# Patient Record
Sex: FEMALE | Race: WHITE | NOT HISPANIC OR LATINO | URBAN - METROPOLITAN AREA
[De-identification: names, ages, dates, MRNs, and addresses within clinical notes are randomized per-mention and may not be internally consistent; named-entity substitution may affect disease eponyms.]

---

## 2022-02-28 ENCOUNTER — OUTPATIENT (OUTPATIENT)
Dept: OUTPATIENT SERVICES | Facility: HOSPITAL | Age: 72
LOS: 1 days | End: 2022-02-28
Payer: MEDICARE

## 2022-02-28 ENCOUNTER — TRANSCRIPTION ENCOUNTER (OUTPATIENT)
Age: 72
End: 2022-02-28

## 2022-02-28 PROCEDURE — 93010 ELECTROCARDIOGRAM REPORT: CPT

## 2022-03-04 DIAGNOSIS — Z01.810 ENCOUNTER FOR PREPROCEDURAL CARDIOVASCULAR EXAMINATION: ICD-10-CM

## 2022-03-04 DIAGNOSIS — Z01.812 ENCOUNTER FOR PREPROCEDURAL LABORATORY EXAMINATION: ICD-10-CM

## 2022-03-04 DIAGNOSIS — M16.11 UNILATERAL PRIMARY OSTEOARTHRITIS, RIGHT HIP: ICD-10-CM

## 2022-03-21 ENCOUNTER — INPATIENT (INPATIENT)
Facility: HOSPITAL | Age: 72
LOS: 1 days | Discharge: NURSING, REHAB OR LONG-TERM CARE | End: 2022-03-23
Payer: MEDICARE

## 2022-03-21 ENCOUNTER — OUTPATIENT (OUTPATIENT)
Dept: OUTPATIENT SERVICES | Facility: HOSPITAL | Age: 72
LOS: 1 days | End: 2022-03-21

## 2022-03-21 PROCEDURE — 88304 TISSUE EXAM BY PATHOLOGIST: CPT | Mod: 26

## 2022-03-21 PROCEDURE — 88311 DECALCIFY TISSUE: CPT | Mod: 26

## 2022-03-22 ENCOUNTER — OUTPATIENT (OUTPATIENT)
Dept: OUTPATIENT SERVICES | Facility: HOSPITAL | Age: 72
LOS: 1 days | End: 2022-03-22

## 2022-03-23 ENCOUNTER — OUTPATIENT (OUTPATIENT)
Dept: OUTPATIENT SERVICES | Facility: HOSPITAL | Age: 72
LOS: 1 days | End: 2022-03-23

## 2022-03-23 ENCOUNTER — INPATIENT (INPATIENT)
Facility: HOSPITAL | Age: 72
LOS: 7 days | Discharge: HOME | End: 2022-03-31
Payer: MEDICARE

## 2022-03-28 DIAGNOSIS — M16.11 UNILATERAL PRIMARY OSTEOARTHRITIS, RIGHT HIP: ICD-10-CM

## 2022-03-28 DIAGNOSIS — M06.9 RHEUMATOID ARTHRITIS, UNSPECIFIED: ICD-10-CM

## 2022-03-28 DIAGNOSIS — K21.9 GASTRO-ESOPHAGEAL REFLUX DISEASE WITHOUT ESOPHAGITIS: ICD-10-CM

## 2022-03-28 DIAGNOSIS — R73.09 OTHER ABNORMAL GLUCOSE: ICD-10-CM

## 2022-03-28 DIAGNOSIS — Z20.822 CONTACT WITH AND (SUSPECTED) EXPOSURE TO COVID-19: ICD-10-CM

## 2022-03-28 DIAGNOSIS — Z79.02 LONG TERM (CURRENT) USE OF ANTITHROMBOTICS/ANTIPLATELETS: ICD-10-CM

## 2022-03-28 DIAGNOSIS — Z87.891 PERSONAL HISTORY OF NICOTINE DEPENDENCE: ICD-10-CM

## 2022-03-28 DIAGNOSIS — E78.5 HYPERLIPIDEMIA, UNSPECIFIED: ICD-10-CM

## 2022-03-28 DIAGNOSIS — Z86.73 PERSONAL HISTORY OF TRANSIENT ISCHEMIC ATTACK (TIA), AND CEREBRAL INFARCTION WITHOUT RESIDUAL DEFICITS: ICD-10-CM

## 2022-03-28 DIAGNOSIS — I10 ESSENTIAL (PRIMARY) HYPERTENSION: ICD-10-CM

## 2022-03-30 PROCEDURE — 93970 EXTREMITY STUDY: CPT | Mod: 26

## 2022-04-01 DIAGNOSIS — Z51.89 ENCOUNTER FOR OTHER SPECIFIED AFTERCARE: ICD-10-CM

## 2022-04-01 DIAGNOSIS — M06.9 RHEUMATOID ARTHRITIS, UNSPECIFIED: ICD-10-CM

## 2022-04-01 DIAGNOSIS — Z96.641 PRESENCE OF RIGHT ARTIFICIAL HIP JOINT: ICD-10-CM

## 2022-04-01 DIAGNOSIS — G60.9 HEREDITARY AND IDIOPATHIC NEUROPATHY, UNSPECIFIED: ICD-10-CM

## 2022-04-01 DIAGNOSIS — I10 ESSENTIAL (PRIMARY) HYPERTENSION: ICD-10-CM

## 2022-04-01 DIAGNOSIS — Z47.1 AFTERCARE FOLLOWING JOINT REPLACEMENT SURGERY: ICD-10-CM

## 2022-04-01 DIAGNOSIS — R26.89 OTHER ABNORMALITIES OF GAIT AND MOBILITY: ICD-10-CM

## 2022-04-01 DIAGNOSIS — M62.50 MUSCLE WASTING AND ATROPHY, NOT ELSEWHERE CLASSIFIED, UNSPECIFIED SITE: ICD-10-CM

## 2022-04-01 DIAGNOSIS — Z86.73 PERSONAL HISTORY OF TRANSIENT ISCHEMIC ATTACK (TIA), AND CEREBRAL INFARCTION WITHOUT RESIDUAL DEFICITS: ICD-10-CM

## 2022-04-06 DIAGNOSIS — M16.11 UNILATERAL PRIMARY OSTEOARTHRITIS, RIGHT HIP: ICD-10-CM

## 2022-04-09 DIAGNOSIS — Z96.641 PRESENCE OF RIGHT ARTIFICIAL HIP JOINT: ICD-10-CM

## 2022-04-09 DIAGNOSIS — E11.9 TYPE 2 DIABETES MELLITUS WITHOUT COMPLICATIONS: ICD-10-CM

## 2022-04-13 DIAGNOSIS — M16.11 UNILATERAL PRIMARY OSTEOARTHRITIS, RIGHT HIP: ICD-10-CM

## 2022-05-14 ENCOUNTER — NON-APPOINTMENT (OUTPATIENT)
Age: 72
End: 2022-05-14

## 2022-09-05 ENCOUNTER — NON-APPOINTMENT (OUTPATIENT)
Age: 72
End: 2022-09-05

## 2022-12-06 ENCOUNTER — NON-APPOINTMENT (OUTPATIENT)
Age: 72
End: 2022-12-06

## 2023-03-20 ENCOUNTER — EMERGENCY (EMERGENCY)
Facility: HOSPITAL | Age: 73
LOS: 1 days | Discharge: HOME | End: 2023-03-20
Attending: EMERGENCY MEDICINE
Payer: MEDICARE

## 2023-03-20 VITALS
RESPIRATION RATE: 18 BRPM | OXYGEN SATURATION: 96 % | TEMPERATURE: 99 F | WEIGHT: 218.04 LBS | HEART RATE: 72 BPM | SYSTOLIC BLOOD PRESSURE: 106 MMHG | DIASTOLIC BLOOD PRESSURE: 66 MMHG

## 2023-03-20 LAB
ALBUMIN SERPL ELPH-MCNC: 3.7 G/DL — SIGNIFICANT CHANGE UP (ref 3.3–5.2)
ALP SERPL-CCNC: 65 U/L — SIGNIFICANT CHANGE UP (ref 40–120)
ALT FLD-CCNC: 32 U/L — SIGNIFICANT CHANGE UP
ANION GAP SERPL CALC-SCNC: 12 MMOL/L — SIGNIFICANT CHANGE UP (ref 5–17)
APPEARANCE UR: CLEAR — SIGNIFICANT CHANGE UP
APTT BLD: 26.4 SEC — LOW (ref 27.5–35.5)
AST SERPL-CCNC: 39 U/L — HIGH
BASE EXCESS BLDV CALC-SCNC: 0.2 MMOL/L — SIGNIFICANT CHANGE UP (ref -2–3)
BASOPHILS # BLD AUTO: 0.02 K/UL — SIGNIFICANT CHANGE UP (ref 0–0.2)
BASOPHILS NFR BLD AUTO: 0.3 % — SIGNIFICANT CHANGE UP (ref 0–2)
BILIRUB SERPL-MCNC: 0.3 MG/DL — LOW (ref 0.4–2)
BILIRUB UR-MCNC: NEGATIVE — SIGNIFICANT CHANGE UP
BUN SERPL-MCNC: 35.7 MG/DL — HIGH (ref 8–20)
CA-I SERPL-SCNC: 1.36 MMOL/L — HIGH (ref 1.15–1.33)
CALCIUM SERPL-MCNC: 10.2 MG/DL — SIGNIFICANT CHANGE UP (ref 8.4–10.5)
CHLORIDE BLDV-SCNC: 103 MMOL/L — SIGNIFICANT CHANGE UP (ref 96–108)
CHLORIDE SERPL-SCNC: 102 MMOL/L — SIGNIFICANT CHANGE UP (ref 96–108)
CO2 SERPL-SCNC: 26 MMOL/L — SIGNIFICANT CHANGE UP (ref 22–29)
COLOR SPEC: YELLOW — SIGNIFICANT CHANGE UP
CREAT SERPL-MCNC: 1.4 MG/DL — HIGH (ref 0.5–1.3)
DIFF PNL FLD: NEGATIVE — SIGNIFICANT CHANGE UP
EGFR: 39 ML/MIN/1.73M2 — LOW
EOSINOPHIL # BLD AUTO: 0.12 K/UL — SIGNIFICANT CHANGE UP (ref 0–0.5)
EOSINOPHIL NFR BLD AUTO: 1.9 % — SIGNIFICANT CHANGE UP (ref 0–6)
EPI CELLS # UR: SIGNIFICANT CHANGE UP
ETHANOL SERPL-MCNC: <10 MG/DL — SIGNIFICANT CHANGE UP (ref 0–9)
FLUAV AG NPH QL: SIGNIFICANT CHANGE UP
FLUBV AG NPH QL: SIGNIFICANT CHANGE UP
GAS PNL BLDV: 138 MMOL/L — SIGNIFICANT CHANGE UP (ref 136–145)
GAS PNL BLDV: SIGNIFICANT CHANGE UP
GLUCOSE BLDV-MCNC: 161 MG/DL — HIGH (ref 70–99)
GLUCOSE SERPL-MCNC: 171 MG/DL — HIGH (ref 70–99)
GLUCOSE UR QL: NEGATIVE MG/DL — SIGNIFICANT CHANGE UP
HCO3 BLDV-SCNC: 27 MMOL/L — SIGNIFICANT CHANGE UP (ref 22–29)
HCT VFR BLD CALC: 33 % — LOW (ref 34.5–45)
HCT VFR BLDA CALC: 34 % — SIGNIFICANT CHANGE UP
HGB BLD CALC-MCNC: 11.2 G/DL — LOW (ref 11.7–16.1)
HGB BLD-MCNC: 10.8 G/DL — LOW (ref 11.5–15.5)
HIV 1 & 2 AB SERPL IA.RAPID: SIGNIFICANT CHANGE UP
IMM GRANULOCYTES NFR BLD AUTO: 0.2 % — SIGNIFICANT CHANGE UP (ref 0–0.9)
INR BLD: 1.01 RATIO — SIGNIFICANT CHANGE UP (ref 0.88–1.16)
KETONES UR-MCNC: NEGATIVE — SIGNIFICANT CHANGE UP
LACTATE BLDV-MCNC: 1.7 MMOL/L — SIGNIFICANT CHANGE UP (ref 0.5–2)
LEUKOCYTE ESTERASE UR-ACNC: ABNORMAL
LYMPHOCYTES # BLD AUTO: 2.97 K/UL — SIGNIFICANT CHANGE UP (ref 1–3.3)
LYMPHOCYTES # BLD AUTO: 46.2 % — HIGH (ref 13–44)
MCHC RBC-ENTMCNC: 30.2 PG — SIGNIFICANT CHANGE UP (ref 27–34)
MCHC RBC-ENTMCNC: 32.7 GM/DL — SIGNIFICANT CHANGE UP (ref 32–36)
MCV RBC AUTO: 92.2 FL — SIGNIFICANT CHANGE UP (ref 80–100)
MONOCYTES # BLD AUTO: 0.65 K/UL — SIGNIFICANT CHANGE UP (ref 0–0.9)
MONOCYTES NFR BLD AUTO: 10.1 % — SIGNIFICANT CHANGE UP (ref 2–14)
NEUTROPHILS # BLD AUTO: 2.66 K/UL — SIGNIFICANT CHANGE UP (ref 1.8–7.4)
NEUTROPHILS NFR BLD AUTO: 41.3 % — LOW (ref 43–77)
NITRITE UR-MCNC: NEGATIVE — SIGNIFICANT CHANGE UP
PCO2 BLDV: 54 MMHG — HIGH (ref 39–42)
PH BLDV: 7.3 — LOW (ref 7.32–7.43)
PH UR: 6 — SIGNIFICANT CHANGE UP (ref 5–8)
PLATELET # BLD AUTO: 160 K/UL — SIGNIFICANT CHANGE UP (ref 150–400)
PO2 BLDV: 99 MMHG — HIGH (ref 25–45)
POTASSIUM BLDV-SCNC: 3.9 MMOL/L — SIGNIFICANT CHANGE UP (ref 3.5–5.1)
POTASSIUM SERPL-MCNC: 4.2 MMOL/L — SIGNIFICANT CHANGE UP (ref 3.5–5.3)
POTASSIUM SERPL-SCNC: 4.2 MMOL/L — SIGNIFICANT CHANGE UP (ref 3.5–5.3)
PROT SERPL-MCNC: 6.3 G/DL — LOW (ref 6.6–8.7)
PROT UR-MCNC: NEGATIVE — SIGNIFICANT CHANGE UP
PROTHROM AB SERPL-ACNC: 11.7 SEC — SIGNIFICANT CHANGE UP (ref 10.5–13.4)
RBC # BLD: 3.58 M/UL — LOW (ref 3.8–5.2)
RBC # FLD: 13.5 % — SIGNIFICANT CHANGE UP (ref 10.3–14.5)
RBC CASTS # UR COMP ASSIST: NEGATIVE /HPF — SIGNIFICANT CHANGE UP (ref 0–4)
RSV RNA NPH QL NAA+NON-PROBE: SIGNIFICANT CHANGE UP
SAO2 % BLDV: 99.1 % — SIGNIFICANT CHANGE UP
SARS-COV-2 RNA SPEC QL NAA+PROBE: SIGNIFICANT CHANGE UP
SODIUM SERPL-SCNC: 140 MMOL/L — SIGNIFICANT CHANGE UP (ref 135–145)
SP GR SPEC: 1.01 — SIGNIFICANT CHANGE UP (ref 1.01–1.02)
TROPONIN T SERPL-MCNC: <0.01 NG/ML — SIGNIFICANT CHANGE UP (ref 0–0.06)
UROBILINOGEN FLD QL: NEGATIVE MG/DL — SIGNIFICANT CHANGE UP
WBC # BLD: 6.43 K/UL — SIGNIFICANT CHANGE UP (ref 3.8–10.5)
WBC # FLD AUTO: 6.43 K/UL — SIGNIFICANT CHANGE UP (ref 3.8–10.5)
WBC UR QL: SIGNIFICANT CHANGE UP /HPF (ref 0–5)

## 2023-03-20 PROCEDURE — 70498 CT ANGIOGRAPHY NECK: CPT | Mod: 26,MA

## 2023-03-20 PROCEDURE — 70496 CT ANGIOGRAPHY HEAD: CPT | Mod: 26,MA

## 2023-03-20 PROCEDURE — 0042T: CPT

## 2023-03-20 PROCEDURE — 99223 1ST HOSP IP/OBS HIGH 75: CPT | Mod: FS

## 2023-03-20 RX ORDER — OXYCODONE HYDROCHLORIDE 5 MG/1
10 TABLET ORAL ONCE
Refills: 0 | Status: DISCONTINUED | OUTPATIENT
Start: 2023-03-20 | End: 2023-03-20

## 2023-03-20 RX ADMIN — OXYCODONE HYDROCHLORIDE 10 MILLIGRAM(S): 5 TABLET ORAL at 23:17

## 2023-03-20 NOTE — ED CDU PROVIDER INITIAL DAY NOTE - OBJECTIVE STATEMENT
73 year old female with hx of HTN, 3 TIAs, on plavix, and Rheumatoid arthritis who presents with weakness. At 23:00 patient went to bed feeling normal. Patient states that she woke up at 10:00 with weakness, ataxia, and feeling "Confused." Feels that she is more weak on left side. She thought these symptoms may resolve spontaneously however they progressively worsened, prompting ED visit. BG on arrival 160. Brought to CT immediately for code stroke.

## 2023-03-20 NOTE — ED CDU PROVIDER INITIAL DAY NOTE - CLINICAL SUMMARY MEDICAL DECISION MAKING FREE TEXT BOX
PT with   resolved neurological symptoms       placed in obs for Diagnostic uncertainty. PT seen BY OBS PA found to be appropriate CDU PT care transferred to PA.

## 2023-03-20 NOTE — ED PROVIDER NOTE - CLINICAL SUMMARY MEDICAL DECISION MAKING FREE TEXT BOX
73 year old female with hx of HTN, 3 TIAs, on plavix, and Rheumatoid arthritis who presents with weakness/ataxia that resolved after CT imaging. Labs with elevated creatinine 1.4, hgb 10.8 (no baseline available). Pt stated that prior hx of similar events during UTIs however UA not c/w UTI. CT imaging with left ICA stenosis, otherwise unremarkable. ED consult to neuro will be placed for AM in addition to MRI brain, obs stay for further evaluation

## 2023-03-20 NOTE — ED PROVIDER NOTE - PHYSICAL EXAMINATION
Gen: Well appearing in NAD  Head: NC/AT  Neck: trachea midline  Resp:  No distress  Ext: no deformities  Neuro: see NIHSS   Skin:  Warm and dry as visualized  Psych: Calm, cooperative

## 2023-03-20 NOTE — ED ADULT TRIAGE NOTE - CODE STROKE ACTIVATED DT
May 18, 2018      Stepan Pierre MD  [address] [place] [address]           [facility]phrology  [address] [facility] [address] [place] [address]  Phone: [phone]  Fax: [phone]          Patient: David Amaro   MR Number: [id number]   YOB: 1947   Date of Visit: 5/18/2018       Dear Dr. Stepan Pierre:    Thank you for referring David Amaro to me for evaluation. Attached you will find relevant portions of my assessment and plan of care.    If you have questions, please do not hesitate to call me. I look forward to following David Amaro along with you.    Sincerely,    Kurtis Garrett MD    Enclosure  CC:  No Recipients    If you would like to receive this communication electronically, please contact [email or web address] or [phone] to request more information on [employer] access.    For providers and/or their staff who would like to refer a patient to [facility], please contact us through our one-stop-shop provider referral line, [facility] [place], operated by [facility], at [phone].    If you feel you have received this communication in error or would no longer like to receive these types of communications, please e-mail [email or web address]         
20-Mar-2023 20:00

## 2023-03-20 NOTE — ED ADULT TRIAGE NOTE - CHIEF COMPLAINT QUOTE
c/o of generalized weakness and neck pain since this 10am. report fell this morning.  Hx of TIA x 3 on plavix

## 2023-03-20 NOTE — ED PROVIDER NOTE - OBJECTIVE STATEMENT
73 year old female with hx of HTN, 3 TIAs, on plavix who presents with weakness. Patient states that she woke up at 10:00 with weakness, ataxia, 73 year old female with hx of HTN, 3 TIAs, on plavix, and Rheumatoid arthritis who presents with weakness. At 23:00 patient went to bed feeling normal. Patient states that she woke up at 10:00 with weakness, ataxia, and feeling "Confused." Feels that she is more weak on left side. She thought these symptoms may resolve spontaneously however they progressively worsened, prompting ED visit. BG on arrival 160. Brought to CT immediately for code stroke.

## 2023-03-20 NOTE — ED ADULT NURSE NOTE - NSIMPLEMENTINTERV_GEN_ALL_ED
Implemented All Fall with Harm Risk Interventions:  [place] to call system. Call bell, personal items and telephone within reach. Instruct patient to call for assistance. Room bathroom lighting operational. Non-slip footwear when patient is off stretcher. Physically safe environment: no spills, clutter or unnecessary equipment. Stretcher in lowest position, wheels locked, appropriate side rails in place. Provide visual cue, wrist band, yellow gown, etc. Monitor gait and stability. Monitor for mental status changes and reorient to person, place, and time. Review medications for side effects contributing to fall risk. Reinforce activity limits and safety measures with patient and family. Provide visual clues: red socks.

## 2023-03-20 NOTE — ED PROVIDER NOTE - PROGRESS NOTE DETAILS
Derrick: IV access attempted during initial interview however will do CT non con and then re attempt IV access Derrick: Received call from Dr. Carter, CT head non con negative. Derrick: IV access attempted during initial interview however will do CT non con and then re attempt IV access. No ataxia noted during short ambulation to CT table.

## 2023-03-20 NOTE — ED ADULT NURSE NOTE - OBJECTIVE STATEMENT
pt A&Ox4, c/o right head and neck pain, generalized weakness but reports feeling worsening weakness to left side and that she keeps dropping things with her left hand that she noticed when she woke up today at 10am LKW yesterday at 11pm when she went to bed, resp even and unlabored no distress noted, abd soft NTND, pt has hx of TIAs and is on Plavix

## 2023-03-20 NOTE — ED CDU PROVIDER INITIAL DAY NOTE - NS ED ATTENDING STATEMENT MOD
This was a shared visit with the ADELAIDE. I reviewed and verified the documentation and independently performed the documented:

## 2023-03-21 VITALS
DIASTOLIC BLOOD PRESSURE: 70 MMHG | SYSTOLIC BLOOD PRESSURE: 113 MMHG | OXYGEN SATURATION: 97 % | TEMPERATURE: 98 F | HEART RATE: 66 BPM | RESPIRATION RATE: 18 BRPM

## 2023-03-21 PROCEDURE — 70551 MRI BRAIN STEM W/O DYE: CPT | Mod: 26,MG

## 2023-03-21 PROCEDURE — G1004: CPT

## 2023-03-21 PROCEDURE — 99238 HOSP IP/OBS DSCHRG MGMT 30/<: CPT

## 2023-03-21 RX ORDER — METOPROLOL TARTRATE 50 MG
100 TABLET ORAL DAILY
Refills: 0 | Status: DISCONTINUED | OUTPATIENT
Start: 2023-03-21 | End: 2023-03-22

## 2023-03-21 RX ORDER — ATORVASTATIN CALCIUM 80 MG/1
20 TABLET, FILM COATED ORAL AT BEDTIME
Refills: 0 | Status: DISCONTINUED | OUTPATIENT
Start: 2023-03-21 | End: 2023-03-22

## 2023-03-21 RX ORDER — OXYCODONE HYDROCHLORIDE 5 MG/1
10 TABLET ORAL
Refills: 0 | Status: DISCONTINUED | OUTPATIENT
Start: 2023-03-21 | End: 2023-03-22

## 2023-03-21 RX ORDER — CLOPIDOGREL BISULFATE 75 MG/1
75 TABLET, FILM COATED ORAL DAILY
Refills: 0 | Status: DISCONTINUED | OUTPATIENT
Start: 2023-03-21 | End: 2023-03-22

## 2023-03-21 RX ORDER — GABAPENTIN 400 MG/1
200 CAPSULE ORAL ONCE
Refills: 0 | Status: COMPLETED | OUTPATIENT
Start: 2023-03-21 | End: 2023-03-21

## 2023-03-21 RX ORDER — AMLODIPINE BESYLATE 2.5 MG/1
10 TABLET ORAL AT BEDTIME
Refills: 0 | Status: DISCONTINUED | OUTPATIENT
Start: 2023-03-21 | End: 2023-03-22

## 2023-03-21 RX ORDER — OXYCODONE HYDROCHLORIDE 5 MG/1
10 TABLET ORAL ONCE
Refills: 0 | Status: DISCONTINUED | OUTPATIENT
Start: 2023-03-21 | End: 2023-03-22

## 2023-03-21 RX ORDER — VALSARTAN 80 MG/1
320 TABLET ORAL DAILY
Refills: 0 | Status: DISCONTINUED | OUTPATIENT
Start: 2023-03-21 | End: 2023-03-22

## 2023-03-21 RX ADMIN — OXYCODONE HYDROCHLORIDE 10 MILLIGRAM(S): 5 TABLET ORAL at 05:44

## 2023-03-21 RX ADMIN — GABAPENTIN 200 MILLIGRAM(S): 400 CAPSULE ORAL at 05:45

## 2023-03-21 RX ADMIN — VALSARTAN 320 MILLIGRAM(S): 80 TABLET ORAL at 05:46

## 2023-03-21 NOTE — ED CDU PROVIDER DISPOSITION NOTE - ATTENDING CONTRIBUTION TO CARE
placed on observation for MRI imaging after presenting to the ED with left hand weakness, unsteadiness getting OOB and feeling confused.  MRI demonstrates no abnormal restricted diffusion to suggest acute infarction. No abnormal signal is demonstrated throughout the brain parenchyma.  Pt to follow-up with neurology for further investigation.

## 2023-03-21 NOTE — ED CDU PROVIDER DISPOSITION NOTE - CARE PROVIDER_API CALL
Wil Henson)  Neurology  [address] [place] [address], [facility] [address]  [place] [address]  Phone: [phone]  Fax: [phone]  Follow Up Time:

## 2023-03-21 NOTE — ED CDU PROVIDER DISPOSITION NOTE - CLINICAL COURSE
73 year old female with hx of HTN, 3 TIAs, on plavix, and Rheumatoid arthritis who presents with weakness. At 23:00 patient went to bed feeling normal. Patient states that she woke up at 10:00 with weakness, ataxia, and feeling "Confused." Feels that she is more weak on left side. She thought these symptoms may resolve spontaneously however they progressively worsened, prompting ED visit. BG on arrival 160. Brought to CT immediately for code stroke.  CT/CTA head neck unremarkable.  Placed into observation for MRI.  MRI unremarkable.  Stable for outpatient f/u with neurology, given copies of all results.

## 2023-03-21 NOTE — ED ADULT NURSE REASSESSMENT NOTE - NS ED NURSE REASSESS COMMENT FT1
Received pt from [facility]. Pt is alert and O x4. NAD noted. Resp E/U. Pt denies any pain at this time. Pt made aware of the plan of care. Will continue to monitor.
Assumed care of pt at this time. Pt A&O x 3 (disoriented to place) presents to ED c/o of weakness. Pt alert, awake, and following directions. Strength WNL. Sensory intact. PERRL. Denies numbness/tingling. No N/V. Denies lightheadedness or dizziness. Bed locked and in lowest position. Call bell within reach. [place] footwear.

## 2023-03-21 NOTE — ED CDU PROVIDER SUBSEQUENT DAY NOTE - ATTENDING APP SHARED VISIT CONTRIBUTION OF CARE
Seen on morning rounds:   Reports "feeling okay".  Describes difficulty getting out of bed yesterday morning: reports no strength.  Felt unsteady upon getting up and fell.  Reports pain to the right side of the neck and weakness of left hand after episode.  Felt confused.  Denies slurred speech but reports that daughter said that patient was not "making sense".  Pt reports that she had difficulty expressing herself.  Now ok.  Placed on observation for MR imaging:  no abnormal restricted diffusion to suggest acute infarction. No abnormal signal is demonstrated throughout the brain parenchyma.

## 2023-03-21 NOTE — ED ADULT NURSE REASSESSMENT NOTE - NURSING NEURO ORIENTATION
disoriented to place
oriented to person, place and time

## 2023-03-21 NOTE — ED CDU PROVIDER DISPOSITION NOTE - PATIENT PORTAL LINK FT
You can access the FollowMyHealth Patient Portal offered by [facility] by registering at the following website: [email or web address]. By joining [employer]’s FollowMyHealth portal, you will also be able to view your health information using other applications (apps) compatible with our system.

## 2023-03-21 NOTE — ED CDU PROVIDER SUBSEQUENT DAY NOTE - HISTORY
PT placed in OBS, has had no acute incidents or complaints while in CDU PT is stable. PT Placed in OBS for  neurological symptoms that have since resolved, Pt placed in obs for MR.

## 2023-03-21 NOTE — ED ADULT NURSE REASSESSMENT NOTE - GENERAL PATIENT STATE
comfortable appearance/cooperative/resting/sleeping
comfortable appearance/cooperative
comfortable appearance/resting/sleeping

## 2023-03-22 ENCOUNTER — TRANSCRIPTION ENCOUNTER (OUTPATIENT)
Age: 73
End: 2023-03-22

## 2023-03-22 ENCOUNTER — INPATIENT (INPATIENT)
Facility: HOSPITAL | Age: 73
LOS: 2 days | Discharge: HOME | DRG: 62 | End: 2023-03-25
Attending: STUDENT IN AN ORGANIZED HEALTH CARE EDUCATION/TRAINING PROGRAM | Admitting: GENERAL ACUTE CARE HOSPITAL
Payer: MEDICARE

## 2023-03-22 VITALS
OXYGEN SATURATION: 99 % | SYSTOLIC BLOOD PRESSURE: 136 MMHG | TEMPERATURE: 99 F | HEART RATE: 74 BPM | HEIGHT: 67 IN | RESPIRATION RATE: 20 BRPM | DIASTOLIC BLOOD PRESSURE: 90 MMHG

## 2023-03-22 DIAGNOSIS — I63.9 CEREBRAL INFARCTION, UNSPECIFIED: ICD-10-CM

## 2023-03-22 LAB
ALBUMIN SERPL ELPH-MCNC: 3.9 G/DL — SIGNIFICANT CHANGE UP (ref 3.3–5.2)
ALP SERPL-CCNC: 64 U/L — SIGNIFICANT CHANGE UP (ref 40–120)
ALT FLD-CCNC: 35 U/L — HIGH
ANION GAP SERPL CALC-SCNC: 9 MMOL/L — SIGNIFICANT CHANGE UP (ref 5–17)
APTT BLD: 26.5 SEC — LOW (ref 27.5–35.5)
AST SERPL-CCNC: 43 U/L — HIGH
BASOPHILS # BLD AUTO: 0.02 K/UL — SIGNIFICANT CHANGE UP (ref 0–0.2)
BASOPHILS NFR BLD AUTO: 0.4 % — SIGNIFICANT CHANGE UP (ref 0–2)
BILIRUB SERPL-MCNC: 0.4 MG/DL — SIGNIFICANT CHANGE UP (ref 0.4–2)
BUN SERPL-MCNC: 19.2 MG/DL — SIGNIFICANT CHANGE UP (ref 8–20)
CALCIUM SERPL-MCNC: 10.3 MG/DL — SIGNIFICANT CHANGE UP (ref 8.4–10.5)
CHLORIDE SERPL-SCNC: 104 MMOL/L — SIGNIFICANT CHANGE UP (ref 96–108)
CK SERPL-CCNC: 130 U/L — SIGNIFICANT CHANGE UP (ref 25–170)
CO2 SERPL-SCNC: 28 MMOL/L — SIGNIFICANT CHANGE UP (ref 22–29)
CREAT SERPL-MCNC: 1.01 MG/DL — SIGNIFICANT CHANGE UP (ref 0.5–1.3)
EGFR: 58 ML/MIN/1.73M2 — LOW
EOSINOPHIL # BLD AUTO: 0.17 K/UL — SIGNIFICANT CHANGE UP (ref 0–0.5)
EOSINOPHIL NFR BLD AUTO: 3.5 % — SIGNIFICANT CHANGE UP (ref 0–6)
GLUCOSE SERPL-MCNC: 123 MG/DL — HIGH (ref 70–99)
HCT VFR BLD CALC: 33.5 % — LOW (ref 34.5–45)
HGB BLD-MCNC: 11.2 G/DL — LOW (ref 11.5–15.5)
IMM GRANULOCYTES NFR BLD AUTO: 0.2 % — SIGNIFICANT CHANGE UP (ref 0–0.9)
INR BLD: 1.04 RATIO — SIGNIFICANT CHANGE UP (ref 0.88–1.16)
LYMPHOCYTES # BLD AUTO: 1.88 K/UL — SIGNIFICANT CHANGE UP (ref 1–3.3)
LYMPHOCYTES # BLD AUTO: 38.6 % — SIGNIFICANT CHANGE UP (ref 13–44)
MCHC RBC-ENTMCNC: 30.1 PG — SIGNIFICANT CHANGE UP (ref 27–34)
MCHC RBC-ENTMCNC: 33.4 GM/DL — SIGNIFICANT CHANGE UP (ref 32–36)
MCV RBC AUTO: 90.1 FL — SIGNIFICANT CHANGE UP (ref 80–100)
MONOCYTES # BLD AUTO: 0.44 K/UL — SIGNIFICANT CHANGE UP (ref 0–0.9)
MONOCYTES NFR BLD AUTO: 9 % — SIGNIFICANT CHANGE UP (ref 2–14)
NEUTROPHILS # BLD AUTO: 2.35 K/UL — SIGNIFICANT CHANGE UP (ref 1.8–7.4)
NEUTROPHILS NFR BLD AUTO: 48.3 % — SIGNIFICANT CHANGE UP (ref 43–77)
PLATELET # BLD AUTO: 164 K/UL — SIGNIFICANT CHANGE UP (ref 150–400)
POTASSIUM SERPL-MCNC: 4.2 MMOL/L — SIGNIFICANT CHANGE UP (ref 3.5–5.3)
POTASSIUM SERPL-SCNC: 4.2 MMOL/L — SIGNIFICANT CHANGE UP (ref 3.5–5.3)
PROT SERPL-MCNC: 6.6 G/DL — SIGNIFICANT CHANGE UP (ref 6.6–8.7)
PROTHROM AB SERPL-ACNC: 12.1 SEC — SIGNIFICANT CHANGE UP (ref 10.5–13.4)
RAPID RVP RESULT: SIGNIFICANT CHANGE UP
RBC # BLD: 3.72 M/UL — LOW (ref 3.8–5.2)
RBC # FLD: 13.4 % — SIGNIFICANT CHANGE UP (ref 10.3–14.5)
SARS-COV-2 RNA SPEC QL NAA+PROBE: SIGNIFICANT CHANGE UP
SODIUM SERPL-SCNC: 141 MMOL/L — SIGNIFICANT CHANGE UP (ref 135–145)
TROPONIN T SERPL-MCNC: <0.01 NG/ML — SIGNIFICANT CHANGE UP (ref 0–0.06)
WBC # BLD: 4.87 K/UL — SIGNIFICANT CHANGE UP (ref 3.8–10.5)
WBC # FLD AUTO: 4.87 K/UL — SIGNIFICANT CHANGE UP (ref 3.8–10.5)

## 2023-03-22 PROCEDURE — 99222 1ST HOSP IP/OBS MODERATE 55: CPT

## 2023-03-22 PROCEDURE — 93010 ELECTROCARDIOGRAM REPORT: CPT

## 2023-03-22 PROCEDURE — 99291 CRITICAL CARE FIRST HOUR: CPT

## 2023-03-22 PROCEDURE — 71045 X-RAY EXAM CHEST 1 VIEW: CPT | Mod: 26

## 2023-03-22 PROCEDURE — 70450 CT HEAD/BRAIN W/O DYE: CPT | Mod: 26,MA,77

## 2023-03-22 PROCEDURE — 70496 CT ANGIOGRAPHY HEAD: CPT | Mod: 26,MA

## 2023-03-22 PROCEDURE — 0042T: CPT

## 2023-03-22 PROCEDURE — 70498 CT ANGIOGRAPHY NECK: CPT | Mod: 26,MA

## 2023-03-22 RX ORDER — OXYCODONE HYDROCHLORIDE 5 MG/1
10 TABLET ORAL ONCE
Refills: 0 | Status: DISCONTINUED | OUTPATIENT
Start: 2023-03-22 | End: 2023-03-22

## 2023-03-22 RX ORDER — SODIUM CHLORIDE 9 MG/ML
10 INJECTION INTRAMUSCULAR; INTRAVENOUS; SUBCUTANEOUS ONCE
Refills: 0 | Status: COMPLETED | OUTPATIENT
Start: 2023-03-22 | End: 2023-03-22

## 2023-03-22 RX ORDER — ACETAMINOPHEN 500 MG
1000 TABLET ORAL ONCE
Refills: 0 | Status: COMPLETED | OUTPATIENT
Start: 2023-03-22 | End: 2023-03-23

## 2023-03-22 RX ORDER — SODIUM CHLORIDE 9 MG/ML
1000 INJECTION INTRAMUSCULAR; INTRAVENOUS; SUBCUTANEOUS
Refills: 0 | Status: DISCONTINUED | OUTPATIENT
Start: 2023-03-22 | End: 2023-03-28

## 2023-03-22 RX ORDER — ACETAMINOPHEN 500 MG
1000 TABLET ORAL ONCE
Refills: 0 | Status: DISCONTINUED | OUTPATIENT
Start: 2023-03-22 | End: 2023-03-28

## 2023-03-22 RX ORDER — SODIUM CHLORIDE 9 MG/ML
1000 INJECTION INTRAMUSCULAR; INTRAVENOUS; SUBCUTANEOUS
Refills: 0 | Status: DISCONTINUED | OUTPATIENT
Start: 2023-03-22 | End: 2023-03-23

## 2023-03-22 RX ORDER — TENECTEPLASE 50 MG
24 KIT INTRAVENOUS ONCE
Refills: 0 | Status: COMPLETED | OUTPATIENT
Start: 2023-03-22 | End: 2023-03-22

## 2023-03-22 RX ADMIN — SODIUM CHLORIDE 10 MILLILITER(S): 9 INJECTION INTRAMUSCULAR; INTRAVENOUS; SUBCUTANEOUS at 23:58

## 2023-03-22 RX ADMIN — SODIUM CHLORIDE 10 MILLILITER(S): 9 INJECTION INTRAMUSCULAR; INTRAVENOUS; SUBCUTANEOUS at 19:05

## 2023-03-22 RX ADMIN — SODIUM CHLORIDE 10 MILLILITER(S): 9 INJECTION INTRAMUSCULAR; INTRAVENOUS; SUBCUTANEOUS at 23:59

## 2023-03-22 RX ADMIN — TENECTEPLASE 3456 MILLIGRAM(S): KIT at 19:04

## 2023-03-22 RX ADMIN — OXYCODONE HYDROCHLORIDE 10 MILLIGRAM(S): 5 TABLET ORAL at 21:51

## 2023-03-22 NOTE — ED ADULT NURSE NOTE - CHIEF COMPLAINT QUOTE
pt a+ox3, BIBA from home c/o sudden onset of left hand weakness x1 hour ago. pt s/p TIA 2 days ago, reports worsening headache to lower back of head x 1 hour. MD Robertson called to bedside, code stroke activated.

## 2023-03-22 NOTE — H&P ADULT - NSHPREVIEWOFSYSTEMS_GEN_ALL_CORE
Patient denies headache, nausea,LOC,  vomitting, fever, chills, abdominal pain, urinary retention, skin changes.

## 2023-03-22 NOTE — H&P ADULT - HISTORY OF PRESENT ILLNESS
73 year old female with PMH TIA x 4 (most recent 3/21/23), HTN, RA presents with L sided weakness. Pt reports that 1 hour PTA she developed LUE weakness. She states that initially she had difficulty with getting her words out but that has improved back to baseline. No blurry vision, numbness, chets pain, fevers.  CT Head Negative. CTP Negative. NIH 2 on admission, TNK given @ 1904.

## 2023-03-22 NOTE — ED ADULT NURSE REASSESSMENT NOTE - NS ED NURSE REASSESS COMMENT FT1
patient complaining of numbness to distal 3/4/5th fingers from PIP to DIP of 3rd finger left hand and MIP to DIP are of 4/5 with shoulder pain, nih 3, neuro ica sebastien alexander aware will bring back to CT.

## 2023-03-22 NOTE — PHARMACOTHERAPY INTERVENTION NOTE - COMMENTS
Two-clinician verification performed with Dr. Robertson. Patient identification confirmed.  Eight rights of medication administration confirmed.   - Weight confirmed prior to tenecteplase dose calculation: (95.4 kG) = (24 mG) mG tenecteplase  - BP prior to teneceplase administration: (125/89)

## 2023-03-22 NOTE — H&P ADULT - NSHPPHYSICALEXAM_GEN_ALL_CORE
Physical Exam:   Constitutional: NAD, lying in bed   	Neuro  	* Mental Status:  GCS 15: E4, V5, M6. Awake, alert, oriented to conversation. + mild expressive aphasia & word finding difficulty. Able to name objects & their function.   	* Cranial Nerves: Cnii-Cnxii grossly intact. PERRL, EOMI, tongue midline, no gaze deviation.  	* Motor: RUE 5/5, LUE 4+/5, RLE 5/5, LLE 4+/5, slight drift noted in LUE/LLE with dysmetria.  R side intact.   	* Sensory: Sensation intact to light touch  	* Reflexes: Not assessed  	* Gait: Not assessed  Cardiovascular:  S1, S2   Regular rate and rhythm.  Eyes: See neurologic examination with detailed examination of eyes.  ENT: No JVD, Trachea Midline.  Respiratory: Clear to auscultation.  Gastrointestinal: Soft, nontender, nondistended.   Musculoskeletal: No muscle wasting noted,

## 2023-03-22 NOTE — H&P ADULT - ASSESSMENT
Plan:  Neuro:  - Continue q1 hour neuro checks  - CTH 24 hours post tenecteplase at 18:48 tomorrow  -MRI Brain w/o for stroke work up    CV:  -S/p tenecteplase BP < 180/105  -Hydralazine and Labetalol ordered for SBP < 180  - TTE in AM    Resp:  Goal Oxygen > 92%    GI:  NPO until bedside dysphagia  Normal Saline at 75/hr     :  Voiding   Bladder scan q6 if not voiding    Heme:  SCD's    Endo:  HgA1C, TSH in AM  ISS Ordered    73 year old female with PMH TIA x 4 (most recent 3/21/23), HTN, RA presents with L sided weakness. Pt reports that 1 hour PTA she developed LUE weakness. She states that initially she had difficulty with getting her words out but that has improved back to baseline. No blurry vision, numbness, chets pain, fevers.  CT Head Negative. CTP Negative. NIH 2 on admission, TNK given @ 1904.     Plan:  Neuro:  - Continue q1 hour neuro checks  - CTH 24 hours post tenecteplase at 18:48 tomorrow  -MRI Brain w/o for stroke work up    CV:  -S/p tenecteplase BP < 180/105  -Hydralazine and Labetalol ordered for SBP < 180  - TTE in AM    Resp:  Goal Oxygen > 92%    GI:  NPO until bedside dysphagia  Normal Saline at 75/hr     :  Voiding   Bladder scan q6 if not voiding    Heme:  SCD's    Endo:  HgA1C, TSH in AM  ISS Ordered

## 2023-03-22 NOTE — ED ADULT NURSE NOTE - OBJECTIVE STATEMENT
pt alert and oriented x4 comes in c/o sudden onset AMS with left sided arm and leg weakness. pt states she couldn't find her words. pt now a&ox4 talking in full sentences. pt reports TIA 2 days ago. NIH 2. code stroke called, brought to CT scan.

## 2023-03-22 NOTE — H&P ADULT - NSHPLABSRESULTS_GEN_ALL_CORE
R< from: CT Head No Cont (03.22.23 @ 20:55) >    IMPRESSION:    No significant interval change compared to the recent study.  No intracranial hemorrhage, mass effect or large acute cortical infarct.    --- End of Report ---    < end of copied text >    < from: CT Perfusion w/ Maps w/ IV Cont (03.22.23 @ 18:59) >      IMPRESSION:    CT perfusion:  CBF<30% volume: 0 ml  Tmax>6.0s volume: 0 ml  Mismatch volume: 0 ml

## 2023-03-22 NOTE — ED PROVIDER NOTE - CLINICAL SUMMARY MEDICAL DECISION MAKING FREE TEXT BOX
Pt arrived with L sided weakness, NIH 2, similar to prior presentation. However, last time after IV contrast administration the Sx resolved, and this time the pt was re-examined afterwards and drifts persisted. Risks and benefits discussed, and TNK administered.

## 2023-03-22 NOTE — CONSULT NOTE ADULT - SUBJECTIVE AND OBJECTIVE BOX
PM   Neurology consult    JOSE DE JESUS MENDEZ 73y Female         HPI:    73 year old female with PMH TIA x 4 (most recent 3/21/23), HTN, RA presents with L sided weakness. Pt reports that 1 hour PTA she developed LUE weakness. She states that initially she had difficulty with getting her words out but that has improved back to baseline. No blurry vision, numbness, chets pain, fevers.   Of note, pt was noted to have severe ICA stenosis upon her last visit for TIA      PMH: Hypertension    Rheumatoid arthritis    History of TIAs         PSH:       FAMILY HISTORY:      SOCIAL HISTORY:  No history of tobacco or alcohol use     Allergies    No Known Allergies    Intolerances        Height (cm): 170.2 ( @ 18:26)  Weight (kg): 95.4 ( @ 18:32)  BMI (kg/m2): 32.9 ( @ 18:32)    Vital Signs Last 24 Hrs  T(C): 36.8 (22 Mar 2023 19:15), Max: 37 (22 Mar 2023 18:26)  T(F): 98.2 (22 Mar 2023 19:15), Max: 98.6 (22 Mar 2023 18:26)  HR: 80 (22 Mar 2023 19:15) (74 - 85)  BP: 123/58 (22 Mar 2023 19:15) (123/58 - 136/90)  BP(mean): --  RR: 18 (22 Mar 2023 19:15) (15 - 20)  SpO2: 99% (22 Mar 2023 19:15) (99% - 99%)    Parameters below as of 22 Mar 2023 19:00  Patient On (Oxygen Delivery Method): room air      MEDICATIONS          LABS:  CBC Full  -  ( 22 Mar 2023 18:38 )  WBC Count : 4.87 K/uL  RBC Count : 3.72 M/uL  Hemoglobin : 11.2 g/dL  Hematocrit : 33.5 %  Platelet Count - Automated : 164 K/uL  Mean Cell Volume : 90.1 fl  Mean Cell Hemoglobin : 30.1 pg  Mean Cell Hemoglobin Concentration : 33.4 gm/dL  Auto Neutrophil # : 2.35 K/uL  Auto Lymphocyte # : 1.88 K/uL  Auto Monocyte # : 0.44 K/uL  Auto Eosinophil # : 0.17 K/uL  Auto Basophil # : 0.02 K/uL  Auto Neutrophil % : 48.3 %  Auto Lymphocyte % : 38.6 %  Auto Monocyte % : 9.0 %  Auto Eosinophil % : 3.5 %  Auto Basophil % : 0.4 %    Urinalysis Basic - ( 20 Mar 2023 23:20 )    Color: Yellow / Appearance: Clear / S.010 / pH: x  Gluc: x / Ketone: Negative  / Bili: Negative / Urobili: Negative mg/dL   Blood: x / Protein: Negative / Nitrite: Negative   Leuk Esterase: Small / RBC: Negative /HPF / WBC 3-5 /HPF   Sq Epi: x / Non Sq Epi: Occasional / Bacteria: x          140  |  102  |  35.7<H>  ----------------------------<  171<H>  4.2   |  26.0  |  1.40<H>    Ca    10.2      20 Mar 2023 20:18    TPro  6.3<L>  /  Alb  3.7  /  TBili  0.3<L>  /  DBili  x   /  AST  39<H>  /  ALT  32  /  AlkPhos  65      LIVER FUNCTIONS - ( 20 Mar 2023 20:18 )  Alb: 3.7 g/dL / Pro: 6.3 g/dL / ALK PHOS: 65 U/L / ALT: 32 U/L / AST: 39 U/L / GGT: x           Hemoglobin A1C:       PT/INR - ( 20 Mar 2023 20:18 )   PT: 11.7 sec;   INR: 1.01 ratio         PTT - ( 20 Mar 2023 20:18 )  PTT:26.4 sec      On Neurological Examination:    Mental Status - Patient is  awake, alert and oriented X3.  Speech is fluent. Patient can name, repeat and follow commands correctly  There is no dysarthria.    Cranial Nerves - PERRL, EOMI,  Visual fields are full to finger counting, no gross facial asymmetry, tongue/uvula midline    Motor Exam -   Right upper ---5/5 No drift  Left upper ---4+/5 with drift  Right lower ---5/5 No drift  Left lower  ---4+/5 with drift     nml bulk/tone    Sensory    Intact to light touch and pinprick bilaterally    Coord: FTN intact bilaterally     Gait -  Not assessed.                        [facility] SS:   Date: 23  Time:   1a) Level of consciousness (0-3):   1b) Questions (0-2):   1c) Commands (0-2):   2  ) Gaze (0-2):   3  ) Visual field (0-3):   4  ) Facial palsy (0-3):   Motor  5a) Left arm (0-4): -------------------------------1  5b) Right arm (0-4):   6a) Left leg (0-4): -------------------------------1  6b) Right leg (0-4):   7  ) Ataxia (0-2):   Sensory  8  ) Sensory (0-2):   Speech  9  ) Language (0-3):   10) Dysarthria (0-2):   Extinction  11) Extinction/inattention (0-2):     Total score: = 2    Patient was last seen well at 1730  Prestroke Modified Christopher: 0       RADIOLOGY ( All neurological imaging studies were independently reviewed and interpreted by me)  [facility]   CT Brain Stroke Protocol (23 @ 18:52) >  IMPRESSION: No acute intracranial hemorrhage or mass effect.    If clinical suspicion for acute infarct persists, brain MRI can be   obtained if there is no contraindication.    These findings were discussed with Dr. MONSERRAT BATISTA [phone] at   3/22/2023 7:17 PM by Dr. Jose Yee with read back confirmation.      JOSE YEE MD; Attending Radiologist      CTA  CTP   CT Angio Neck Stroke Protocol w/ IV Cont (23 @ 20:41) >    IMPRESSION:    CT PERFUSION demonstrated: No core infarct. No active ischemia. Motion   noted.  If symptoms persist consider follow up head CT or MRI, MRA  if no   contraindication.    CTA COW:  Patent intracranial circulation without flow limiting stenosis.    CTA NECK: Calcified plaque with severe right-sided stenosis and less than   50% left-sided stenosis at ICA origins by NASCET criteria.  Bilateral vertebral arteries are patent without flow limiting stenosis.    KB ALVES MD; Attending Radiologist  This document has been electronically signed. Mar 20 2023  8:51PM      MRI:  TTE       PM

## 2023-03-22 NOTE — PHARMACOTHERAPY INTERVENTION NOTE - COMMENTS
Spoke to patient to obtain medication list. No anticoagulation. On clopidogrel 75 mG qday, last dose approximately 8am 3/22/23. Outpatient Medication Review updated.    HOME MEDICATIONS:  amLODIPine 10 mg oral tablet: 1 tab(s) orally once a day (22 Mar 2023 19:47)  cholecalciferol 125 mcg (5000 intl units) oral capsule: 1 cap(s) orally once a day (22 Mar 2023 19:47)  clopidogrel 75 mg oral tablet: 1 tab(s) orally once a day (22 Mar 2023 19:47)  diphenhydrAMINE 25 mg oral capsule: 1 cap(s) orally once a day (at bedtime), As Needed (22 Mar 2023 19:47)  gabapentin 100 mg oral capsule: 1 capsule (100 mG in the morning) and 2 capsules (200 mG) in the evening (22 Mar 2023 19:47)  melatonin: at bedtime (22 Mar 2023 19:47)  metoprolol succinate 100 mg oral tablet, extended release: 1 tab(s) orally once a day (22 Mar 2023 19:47)  Multiple Vitamins oral tablet: 1 tab(s) orally once a day (22 Mar 2023 19:47)  Orencia Prefilled Syringe: every month (22 Mar 2023 19:47)  oxyCODONE 10 mg oral tablet: 1 tab(s) orally every 6 hours, As Needed (22 Mar 2023 19:47)  rosuvastatin 10 mg oral tablet: 1 tab(s) orally once a day (22 Mar 2023 19:47)  valsartan 320 mg oral tablet: 1 tab(s) orally once a day (22 Mar 2023 19:47)  Vitamin B Complex oral tablet: 1 tab(s) orally once a day (22 Mar 2023 19:47)

## 2023-03-22 NOTE — ED PROVIDER NOTE - CRITICAL CARE ATTENDING CONTRIBUTION TO CARE
Pt arrived with Sx consistent with life threatening condition, CVA, requiring immediate assessment and intervention

## 2023-03-22 NOTE — ED PROVIDER NOTE - OBJECTIVE STATEMENT
73 year old female with PMH TIA x 4 (most recent 3/21/23), HTN, RA presents with L sided weakness. Pt reports that 1 hour PTA she developed LUE weakness. She states that initially she had difficulty with getting her words out but that has improved back to baseline. No blurry vision, numbness, chets pain, fevers.   Of note, pt was noted to have severe ICA stenosis upon her last visit for TIA.

## 2023-03-22 NOTE — ED ADULT NURSE NOTE - ED STAT RN HANDOFF DETAILS
awareof repeat head ct on way up, updated vitals and nih assessment, patient awake and alert, no distress, respirations even and nonlabored

## 2023-03-22 NOTE — H&P ADULT - NSHPLABSRESULTS_GEN_ALL_CORE
R< from: CT Head No Cont (03.22.23 @ 20:55) >    IMPRESSION:    No significant interval change compared to the recent study.  No intracranial hemorrhage, mass effect or large acute cortical infarct.    --- End of Report ---    < end of copied text >    < from: CT Perfusion w/ Maps w/ IV Cont (03.22.23 @ 18:59) >      IMPRESSION:    CT perfusion:  CBF<30% volume: 0 ml  Tmax>6.0s volume: 0 ml  Mismatch volume: 0 ml  Mismatch ratio: None    CT angiography neck:  Retropharyngeal course of bilateral cervical ICAs.  Right carotid system: Severe stenosis of the proximal right ICA using   NASCET criteria.  Left carotid system: No hemodynamically significant stenosis using NASCET   criteria.    CT angiography brain:  1.  No large vessel occlusion.  2.  Two 1 mm conical inferiorly directed outpouchingsarising from the   right supraclinoid ICA favoring infundibula, less likely aneurysms (9:59).    --- End of Report ---    < end of copied text >

## 2023-03-22 NOTE — PATIENT PROFILE ADULT - FALL HARM RISK - HARM RISK INTERVENTIONS

## 2023-03-22 NOTE — DISCHARGE NOTE NURSING/CASE MANAGEMENT/SOCIAL WORK - NSDCPEFALRISK_GEN_ALL_CORE
For information on Fall & Injury Prevention, visit: [email or web address] OR  [email or web address] OR  https://www.cdc.gov/steadi/patient.html

## 2023-03-22 NOTE — PATIENT PROFILE ADULT - NSPROGENBLOODRESTRICT_GEN_A_NUR
Cimzia Counseling:  I discussed with the patient the risks of Cimzia including but not limited to immunosuppression, allergic reactions and infections.  The patient understands that monitoring is required including a PPD at baseline and must alert us or the primary physician if symptoms of infection or other concerning signs are noted. none

## 2023-03-22 NOTE — ED ADULT NURSE REASSESSMENT NOTE - NS ED NURSE REASSESS COMMENT FT1
patient Awake and alert, moving all extremities, spoke with daughter komal at bedside via phone [phone], patient with ica stenosis with occlusionss, patient has had periods of confusion with lucid periods, over the pas tseveral weeks, patient NIH as per right now as charted, patient in no distress, Normal Sinus Rhythm on cardiac montior, respirations even and nonlabored, bilateral 20G intravenous saline locks patent bilaterally.

## 2023-03-22 NOTE — DISCHARGE NOTE NURSING/CASE MANAGEMENT/SOCIAL WORK - PATIENT PORTAL LINK FT
You can access the FollowMyHealth Patient Portal offered by [facility] by registering at the following website: [email or web address]. By joining [employer]’s FollowMyHealth portal, you will also be able to view your health information using other applications (apps) compatible with our system.

## 2023-03-22 NOTE — PATIENT PROFILE ADULT - FUNCTIONAL ASSESSMENT - BASIC MOBILITY 6.
2-calculated by average/Not able to assess (calculate score using [facility] averaging method)

## 2023-03-22 NOTE — PATIENT PROFILE ADULT - FUNCTIONAL ASSESSMENT - DAILY ACTIVITY 6.
Patient complaining of intermittent 8/10 right sided abdominal pain.  Patient denies any CP, SOB, dizziness, N/V/D, fevers, chills, cough or any other complaints at this time. 4 = No assist / stand by assistance

## 2023-03-22 NOTE — ED ADULT NURSE REASSESSMENT NOTE - NS ED NURSE REASSESS COMMENT FT1
neuro icu pa at bedside, daughter updated via phone at bedside, patient assisted to bedside commode, provider catherine crowe in agreement she can use commode to urinate, patient awaiting bed in neuro icu now admitted.

## 2023-03-23 PROBLEM — I10 ESSENTIAL (PRIMARY) HYPERTENSION: Chronic | Status: ACTIVE | Noted: 2023-03-20

## 2023-03-23 PROBLEM — M06.9 RHEUMATOID ARTHRITIS, UNSPECIFIED: Chronic | Status: ACTIVE | Noted: 2023-03-20

## 2023-03-23 PROBLEM — Z86.73 PERSONAL HISTORY OF TRANSIENT ISCHEMIC ATTACK (TIA), AND CEREBRAL INFARCTION WITHOUT RESIDUAL DEFICITS: Chronic | Status: ACTIVE | Noted: 2023-03-20

## 2023-03-23 LAB
A1C WITH ESTIMATED AVERAGE GLUCOSE RESULT: 6.1 % — HIGH (ref 4–5.6)
ANION GAP SERPL CALC-SCNC: 11 MMOL/L — SIGNIFICANT CHANGE UP (ref 5–17)
BUN SERPL-MCNC: 15.7 MG/DL — SIGNIFICANT CHANGE UP (ref 8–20)
CALCIUM SERPL-MCNC: 9.6 MG/DL — SIGNIFICANT CHANGE UP (ref 8.4–10.5)
CHLORIDE SERPL-SCNC: 105 MMOL/L — SIGNIFICANT CHANGE UP (ref 96–108)
CHOLEST SERPL-MCNC: 118 MG/DL — SIGNIFICANT CHANGE UP
CO2 SERPL-SCNC: 26 MMOL/L — SIGNIFICANT CHANGE UP (ref 22–29)
CREAT SERPL-MCNC: 0.91 MG/DL — SIGNIFICANT CHANGE UP (ref 0.5–1.3)
EGFR: 66 ML/MIN/1.73M2 — SIGNIFICANT CHANGE UP
ESTIMATED AVERAGE GLUCOSE: 128 MG/DL — HIGH (ref 68–114)
GLUCOSE SERPL-MCNC: 104 MG/DL — HIGH (ref 70–99)
HCT VFR BLD CALC: 32.8 % — LOW (ref 34.5–45)
HCV AB S/CO SERPL IA: 0.1 S/CO — SIGNIFICANT CHANGE UP (ref 0–0.99)
HCV AB SERPL-IMP: SIGNIFICANT CHANGE UP
HDLC SERPL-MCNC: 29 MG/DL — LOW
HGB BLD-MCNC: 10.9 G/DL — LOW (ref 11.5–15.5)
LIPID PNL WITH DIRECT LDL SERPL: 51 MG/DL — SIGNIFICANT CHANGE UP
MAGNESIUM SERPL-MCNC: 1.9 MG/DL — SIGNIFICANT CHANGE UP (ref 1.6–2.6)
MCHC RBC-ENTMCNC: 29.6 PG — SIGNIFICANT CHANGE UP (ref 27–34)
MCHC RBC-ENTMCNC: 33.2 GM/DL — SIGNIFICANT CHANGE UP (ref 32–36)
MCV RBC AUTO: 89.1 FL — SIGNIFICANT CHANGE UP (ref 80–100)
MRSA PCR RESULT.: SIGNIFICANT CHANGE UP
NON HDL CHOLESTEROL: 89 MG/DL — SIGNIFICANT CHANGE UP
PHOSPHATE SERPL-MCNC: 2.8 MG/DL — SIGNIFICANT CHANGE UP (ref 2.4–4.7)
PLATELET # BLD AUTO: 159 K/UL — SIGNIFICANT CHANGE UP (ref 150–400)
POTASSIUM SERPL-MCNC: 4 MMOL/L — SIGNIFICANT CHANGE UP (ref 3.5–5.3)
POTASSIUM SERPL-SCNC: 4 MMOL/L — SIGNIFICANT CHANGE UP (ref 3.5–5.3)
RBC # BLD: 3.68 M/UL — LOW (ref 3.8–5.2)
RBC # FLD: 13.3 % — SIGNIFICANT CHANGE UP (ref 10.3–14.5)
S AUREUS DNA NOSE QL NAA+PROBE: SIGNIFICANT CHANGE UP
SODIUM SERPL-SCNC: 142 MMOL/L — SIGNIFICANT CHANGE UP (ref 135–145)
TRIGL SERPL-MCNC: 189 MG/DL — HIGH
TSH SERPL-MCNC: 2.12 UIU/ML — SIGNIFICANT CHANGE UP (ref 0.27–4.2)
WBC # BLD: 5.1 K/UL — SIGNIFICANT CHANGE UP (ref 3.8–10.5)
WBC # FLD AUTO: 5.1 K/UL — SIGNIFICANT CHANGE UP (ref 3.8–10.5)

## 2023-03-23 PROCEDURE — 93880 EXTRACRANIAL BILAT STUDY: CPT | Mod: 26

## 2023-03-23 PROCEDURE — 99232 SBSQ HOSP IP/OBS MODERATE 35: CPT

## 2023-03-23 PROCEDURE — 99291 CRITICAL CARE FIRST HOUR: CPT

## 2023-03-23 PROCEDURE — 99222 1ST HOSP IP/OBS MODERATE 55: CPT

## 2023-03-23 PROCEDURE — 93970 EXTREMITY STUDY: CPT | Mod: 26

## 2023-03-23 PROCEDURE — 70450 CT HEAD/BRAIN W/O DYE: CPT | Mod: 26

## 2023-03-23 RX ORDER — CHOLECALCIFEROL (VITAMIN D3) 125 MCG
5000 CAPSULE ORAL DAILY
Refills: 0 | Status: DISCONTINUED | OUTPATIENT
Start: 2023-03-23 | End: 2023-03-25

## 2023-03-23 RX ORDER — GABAPENTIN 400 MG/1
200 CAPSULE ORAL DAILY
Refills: 0 | Status: DISCONTINUED | OUTPATIENT
Start: 2023-03-23 | End: 2023-03-23

## 2023-03-23 RX ORDER — CHLORHEXIDINE GLUCONATE 213 G/1000ML
1 SOLUTION TOPICAL DAILY
Refills: 0 | Status: DISCONTINUED | OUTPATIENT
Start: 2023-03-23 | End: 2023-03-24

## 2023-03-23 RX ORDER — GABAPENTIN 400 MG/1
100 CAPSULE ORAL DAILY
Refills: 0 | Status: DISCONTINUED | OUTPATIENT
Start: 2023-03-23 | End: 2023-03-23

## 2023-03-23 RX ORDER — METOPROLOL TARTRATE 50 MG
50 TABLET ORAL
Refills: 0 | Status: DISCONTINUED | OUTPATIENT
Start: 2023-03-23 | End: 2023-03-23

## 2023-03-23 RX ORDER — METOPROLOL TARTRATE 50 MG
50 TABLET ORAL
Refills: 0 | Status: DISCONTINUED | OUTPATIENT
Start: 2023-03-23 | End: 2023-03-25

## 2023-03-23 RX ORDER — OXYCODONE HYDROCHLORIDE 5 MG/1
5 TABLET ORAL EVERY 6 HOURS
Refills: 0 | Status: DISCONTINUED | OUTPATIENT
Start: 2023-03-23 | End: 2023-03-25

## 2023-03-23 RX ORDER — GABAPENTIN 400 MG/1
200 CAPSULE ORAL
Refills: 0 | Status: DISCONTINUED | OUTPATIENT
Start: 2023-03-23 | End: 2023-03-25

## 2023-03-23 RX ORDER — LANOLIN ALCOHOL/MO/W.PET/CERES
5 CREAM (GRAM) TOPICAL AT BEDTIME
Refills: 0 | Status: DISCONTINUED | OUTPATIENT
Start: 2023-03-23 | End: 2023-03-25

## 2023-03-23 RX ORDER — ACETAMINOPHEN 500 MG
650 TABLET ORAL EVERY 6 HOURS
Refills: 0 | Status: DISCONTINUED | OUTPATIENT
Start: 2023-03-23 | End: 2023-03-25

## 2023-03-23 RX ORDER — SENNA PLUS 8.6 MG/1
2 TABLET ORAL AT BEDTIME
Refills: 0 | Status: DISCONTINUED | OUTPATIENT
Start: 2023-03-23 | End: 2023-03-25

## 2023-03-23 RX ORDER — POLYETHYLENE GLYCOL 3350 17 G/17G
17 POWDER, FOR SOLUTION ORAL
Refills: 0 | Status: DISCONTINUED | OUTPATIENT
Start: 2023-03-23 | End: 2023-03-25

## 2023-03-23 RX ORDER — ASPIRIN/CALCIUM CARB/MAGNESIUM 324 MG
81 TABLET ORAL DAILY
Refills: 0 | Status: DISCONTINUED | OUTPATIENT
Start: 2023-03-23 | End: 2023-03-25

## 2023-03-23 RX ORDER — ENOXAPARIN SODIUM 100 MG/ML
40 INJECTION SUBCUTANEOUS EVERY 24 HOURS
Refills: 0 | Status: DISCONTINUED | OUTPATIENT
Start: 2023-03-23 | End: 2023-03-25

## 2023-03-23 RX ORDER — OXYCODONE HYDROCHLORIDE 5 MG/1
10 TABLET ORAL EVERY 6 HOURS
Refills: 0 | Status: DISCONTINUED | OUTPATIENT
Start: 2023-03-23 | End: 2023-03-23

## 2023-03-23 RX ORDER — GABAPENTIN 400 MG/1
100 CAPSULE ORAL
Refills: 0 | Status: DISCONTINUED | OUTPATIENT
Start: 2023-03-23 | End: 2023-03-25

## 2023-03-23 RX ORDER — OXYCODONE HYDROCHLORIDE 5 MG/1
10 TABLET ORAL EVERY 6 HOURS
Refills: 0 | Status: DISCONTINUED | OUTPATIENT
Start: 2023-03-23 | End: 2023-03-25

## 2023-03-23 RX ORDER — ATORVASTATIN CALCIUM 80 MG/1
20 TABLET, FILM COATED ORAL AT BEDTIME
Refills: 0 | Status: DISCONTINUED | OUTPATIENT
Start: 2023-03-23 | End: 2023-03-25

## 2023-03-23 RX ADMIN — Medication 5000 UNIT(S): at 12:28

## 2023-03-23 RX ADMIN — ATORVASTATIN CALCIUM 20 MILLIGRAM(S): 80 TABLET, FILM COATED ORAL at 21:09

## 2023-03-23 RX ADMIN — OXYCODONE HYDROCHLORIDE 10 MILLIGRAM(S): 5 TABLET ORAL at 02:35

## 2023-03-23 RX ADMIN — Medication 400 MILLIGRAM(S): at 02:35

## 2023-03-23 RX ADMIN — OXYCODONE HYDROCHLORIDE 10 MILLIGRAM(S): 5 TABLET ORAL at 09:45

## 2023-03-23 RX ADMIN — OXYCODONE HYDROCHLORIDE 5 MILLIGRAM(S): 5 TABLET ORAL at 18:05

## 2023-03-23 RX ADMIN — OXYCODONE HYDROCHLORIDE 10 MILLIGRAM(S): 5 TABLET ORAL at 23:35

## 2023-03-23 RX ADMIN — OXYCODONE HYDROCHLORIDE 5 MILLIGRAM(S): 5 TABLET ORAL at 17:35

## 2023-03-23 RX ADMIN — OXYCODONE HYDROCHLORIDE 10 MILLIGRAM(S): 5 TABLET ORAL at 10:15

## 2023-03-23 RX ADMIN — Medication 1000 MILLIGRAM(S): at 04:43

## 2023-03-23 RX ADMIN — CHLORHEXIDINE GLUCONATE 1 APPLICATION(S): 213 SOLUTION TOPICAL at 12:30

## 2023-03-23 RX ADMIN — SODIUM CHLORIDE 75 MILLILITER(S): 9 INJECTION INTRAMUSCULAR; INTRAVENOUS; SUBCUTANEOUS at 02:35

## 2023-03-23 RX ADMIN — Medication 1 TABLET(S): at 12:30

## 2023-03-23 RX ADMIN — GABAPENTIN 200 MILLIGRAM(S): 400 CAPSULE ORAL at 21:09

## 2023-03-23 RX ADMIN — Medication 5 MILLIGRAM(S): at 21:09

## 2023-03-23 RX ADMIN — GABAPENTIN 100 MILLIGRAM(S): 400 CAPSULE ORAL at 12:28

## 2023-03-23 RX ADMIN — OXYCODONE HYDROCHLORIDE 10 MILLIGRAM(S): 5 TABLET ORAL at 04:43

## 2023-03-23 RX ADMIN — OXYCODONE HYDROCHLORIDE 10 MILLIGRAM(S): 5 TABLET ORAL at 00:18

## 2023-03-23 NOTE — PROGRESS NOTE ADULT - SUBJECTIVE AND OBJECTIVE BOX
[facility] Neurology Stroke Team      Preliminary note, official recommendations pending attending review/signature     CC: left side weakness, aphasia  HPI: 73 year old female with PMH TIA x 4 (most recent 3/21/23), HTN, RA presented with L sided weakness. Pt reports that 1 hour PTA she developed LUE weakness. She states that initially she had difficulty with getting her words out but that has improved back to baseline. No blurry vision, numbness, chets pain, fevers.   Of note, pt was noted to have severe ICA stenosis upon her last visit for TIA 3/21/23.    SUBJECTIVE: No events overnight.  No new neurologic complaints.  ROS reported negative unless otherwise noted.    acetaminophen     Tablet .. 650 milliGRAM(s) Oral every 6 hours PRN  atorvastatin 20 milliGRAM(s) Oral at bedtime  chlorhexidine 2% Cloths 1 Application(s) Topical daily  cholecalciferol 5000 Unit(s) Oral daily  gabapentin 100 milliGRAM(s) Oral <User Schedule>  gabapentin 200 milliGRAM(s) Oral <User Schedule>  metoprolol tartrate 50 milliGRAM(s) Oral two times a day  multivitamin 1 Tablet(s) Oral daily  oxyCODONE    IR 10 milliGRAM(s) Oral every 6 hours PRN  polyethylene glycol 3350 17 Gram(s) Oral two times a day  senna 2 Tablet(s) Oral at bedtime      PHYSICAL EXAM:   Vital Signs Last 24 Hrs  T(C): 36.8 (23 Mar 2023 08:15), Max: 37.1 (22 Mar 2023 21:00)  T(F): 98.2 (23 Mar 2023 08:15), Max: 98.8 (22 Mar 2023 21:00)  HR: 73 (23 Mar 2023 10:04) (62 - 87)  BP: 111/70 (23 Mar 2023 10:04) (105/55 - 157/70)  BP(mean): 80 (23 Mar 2023 10:04) (70 - 106)  RR: 12 (23 Mar 2023 10:04) (8 - 20)  SpO2: 99% (23 Mar 2023 10:04) (92% - 100%)    Parameters below as of 23 Mar 2023 08:04  Patient On (Oxygen Delivery Method): room air        General: No acute distress    NEUROLOGICAL EXAM:  Mental status: Awake, alert, oriented x3, speech fluent, follows commands, no neglect, normal memory , follows commands, IDs objects, repetition intact   Cranial Nerves: No facial asymmetry, no nystagmus, no dysarthria,  tongue midline  Motor exam: Normal tone, subtle drift left upper and left lower extremities,  5/5 RUE, 5/5 RLE, 4/5 LUE, 4/5 LLE, normal fine finger movements.  Sensation: Intact to light touch   Coordination/ Gait: No dysmetria, gait not tested    LABS:                        10.9   5.10  )-----------( 159      ( 23 Mar 2023 02:16 )             32.8    03-23    142  |  105  |  15.7  ----------------------------<  104<H>  4.0   |  26.0  |  0.91    Ca    9.6      23 Mar 2023 02:16  Phos  2.8     03-23  Mg     1.9     03-23    TPro  6.6  /  Alb  3.9  /  TBili  0.4  /  DBili  x   /  AST  43<H>  /  ALT  35<H>  /  AlkPhos  64  03-22  PT/INR - ( 22 Mar 2023 18:38 )   PT: 12.1 sec;   INR: 1.04 ratio         PTT - ( 22 Mar 2023 18:38 )  PTT:26.5 sec      IMAGING: Reviewed by me.   CT Angio Neck w/ IV Cont (03.22.23 @ 19:01)   CT perfusion:  CBF<30% volume: 0 ml  Tmax>6.0s volume: 0 ml  Mismatch volume: 0 ml  Mismatch ratio: None  CT angiography neck:  Retropharyngeal course of bilateral cervical ICAs.  Right carotid system: Severe stenosis of the proximal right ICA using   NASCET criteria.  Left carotid system: No hemodynamically significant stenosis using NASCET   criteria.  CT angiography brain:  1.  No large vessel occlusion.  2.  Two 1 mm conical inferiorly directed outpouchingsarising from the   right supraclinoid ICA favoring infundibula, less likely aneurysms (9:59).    MR Head No Cont (03.21.23 @ 08:25)   IMPRESSION: No acute infarction.                                       [facility] Neurology Stroke Team      CC: left side weakness, aphasia  HPI: 73 year old female with PMH TIA x 4 (most recent 3/21/23), HTN, RA presented with L sided weakness. Pt reports that 1 hour PTA she developed LUE weakness. She states that initially she had difficulty with getting her words out but that has improved back to baseline. No blurry vision, numbness, chets pain, fevers.   Of note, pt was noted to have severe ICA stenosis upon her last visit for TIA 3/21/23.    SUBJECTIVE: No events overnight.  No new neurologic complaints.  ROS reported negative unless otherwise noted.    acetaminophen     Tablet .. 650 milliGRAM(s) Oral every 6 hours PRN  atorvastatin 20 milliGRAM(s) Oral at bedtime  chlorhexidine 2% Cloths 1 Application(s) Topical daily  cholecalciferol 5000 Unit(s) Oral daily  gabapentin 100 milliGRAM(s) Oral <User Schedule>  gabapentin 200 milliGRAM(s) Oral <User Schedule>  metoprolol tartrate 50 milliGRAM(s) Oral two times a day  multivitamin 1 Tablet(s) Oral daily  oxyCODONE    IR 10 milliGRAM(s) Oral every 6 hours PRN  polyethylene glycol 3350 17 Gram(s) Oral two times a day  senna 2 Tablet(s) Oral at bedtime      PHYSICAL EXAM:   Vital Signs Last 24 Hrs  T(C): 36.8 (23 Mar 2023 08:15), Max: 37.1 (22 Mar 2023 21:00)  T(F): 98.2 (23 Mar 2023 08:15), Max: 98.8 (22 Mar 2023 21:00)  HR: 73 (23 Mar 2023 10:04) (62 - 87)  BP: 111/70 (23 Mar 2023 10:04) (105/55 - 157/70)  BP(mean): 80 (23 Mar 2023 10:04) (70 - 106)  RR: 12 (23 Mar 2023 10:04) (8 - 20)  SpO2: 99% (23 Mar 2023 10:04) (92% - 100%)    Parameters below as of 23 Mar 2023 08:04  Patient On (Oxygen Delivery Method): room air    General: No acute distress    NEUROLOGICAL EXAM:  Mental status: Awake, alert, oriented x3, speech fluent, follows commands, no neglect, normal memory , follows commands, IDs objects, repetition intact   Cranial Nerves: No facial asymmetry, no nystagmus, no dysarthria,  tongue midline  Motor exam: Normal tone, subtle drift left upper and left lower extremities,  5/5 RUE, 5/5 RLE, 4/5 LUE, 4/5 LLE, normal fine finger movements.  Sensation: Intact to light touch   Coordination/ Gait: No dysmetria, gait not tested    LABS:                        10.9   5.10  )-----------( 159      ( 23 Mar 2023 02:16 )             32.8    03-23    142  |  105  |  15.7  ----------------------------<  104<H>  4.0   |  26.0  |  0.91    Ca    9.6      23 Mar 2023 02:16  Phos  2.8     03-23  Mg     1.9     03-23    TPro  6.6  /  Alb  3.9  /  TBili  0.4  /  DBili  x   /  AST  43<H>  /  ALT  35<H>  /  AlkPhos  64  03-22  PT/INR - ( 22 Mar 2023 18:38 )   PT: 12.1 sec;   INR: 1.04 ratio         PTT - ( 22 Mar 2023 18:38 )  PTT:26.5 sec      IMAGING: Reviewed by me.   CT Angio Neck w/ IV Cont (03.22.23 @ 19:01)   CT perfusion:  CBF<30% volume: 0 ml  Tmax>6.0s volume: 0 ml  Mismatch volume: 0 ml  Mismatch ratio: None  CT angiography neck:  Retropharyngeal course of bilateral cervical ICAs.  Right carotid system: Severe stenosis of the proximal right ICA using   NASCET criteria.  Left carotid system: No hemodynamically significant stenosis using NASCET   criteria.  CT angiography brain:  1.  No large vessel occlusion.  2.  Two 1 mm conical inferiorly directed outpouchingsarising from the   right supraclinoid ICA favoring infundibula, less likely aneurysms (9:59).    MR Head No Cont (03.21.23 @ 08:25)   IMPRESSION: No acute infarction.

## 2023-03-23 NOTE — PROGRESS NOTE ADULT - ASSESSMENT
ASSESSMENT:  73 year old female with PMH TIA x 4 (most recent 3/21/23), HTN, RA presented with Left sided weakness and aphasia. NIH 2. Was a candidate and received tenecteplase at 1904 on 3/22/23.  CTA head/ neck  without large vessel occlusion therefor not thrombectomy candidate. Additionally noted,  Two 1 mm conical inferiorly directed out pouchings arising from the  right supraclinoid ICA favoring infundibula, less likely aneurysms. Right carotid severe stenosis. Rule out cerebral infarction, possibly symptomatic large artery atherosclerotic disease.     NEURO: Continue close monitoring for neurologic deterioration, with stroke check q 1 hour. Permissive HTN,  currently neurologically stable at  110-130 mmHg. Home statin regimen: LDL at goal of < 70,  MRI Brain w/o, carotid doppler.  Neuro IR consult for evaluation and consideration in carotid revascularization pending findings of noted workup.  Physical therapy/OT/Speech eval/treatment.     ANTITHROMBOTIC THERAPY: on hold at this time as per post Tenecteplase protocol, initiation pending findings of 24 hour post Tenecteplase CT head.     PULMONARY:  protecting airway, saturating well     CARDIOVASCULAR: check TTE, cardiac monitoring                             GASTROINTESTINAL:  dysphagia screen passed, advance as tolerated      Diet: DASH diet    RENAL: BUN/Cr 15.7/0.91, monitor urine output , maintain adequate hydration      Na Goal:  135-145     Harrison: N    HEMATOLOGY: H/H 10.9/32.8, Platelets 159, patient should have all age and risk appropriate malignancy screening     DVT ppx: Heparin s.c [] LMWH [] SCD (post tenecteplase protocol)    ID: afebrile, no leukocytosis, monitor for infection    ENDOCR: A1C 6.1,  diabetic/nutrition educator- diet/lifestyle modifications , continue risk factor control of DM, HTN, HLD.    DISPOSITION: Rehab or home depending on PT eval once stable and workup is complete      CORE MEASURES:        Admission NIHSS: 2     Tenecteplase : [x] YES [] NO      LDL/HDL/A1C: 51/29/6.1     Depression Screen: P     Statin Therapy: Atorvastatin 20 mg daily      Dysphagia Screen: [x] PASS [] FAIL     Smoking [] YES [x] NO      Afib [] YES [x] NO     Stroke Education [x] YES [] NO    Obtain screening lower extremity venous ultrasound in patients who meet 1 or more of the following criteria as patient is high risk for DVT/PE on admission:   [] History of DVT/PE  []Hypercoagulable states (Factor V Leiden, Cancer, OCP, etc. )  []Prolonged immobility (hemiplegia/hemiparesis/post operative or any other extended immobilization)  [] Transferred from outside facility (Rehab or Long term care)  [] Age </= to 50

## 2023-03-23 NOTE — PROGRESS NOTE ADULT - ASSESSMENT
73 year old female with stroke-like Sx; LKWT approx. 1730 on 03/22/2023 (1 hr prior to arrival); NIH 2, mRS 0; received IV thrombolytic.  No LVO.  Intracranial atherosclerotic dz with severe proximal R ICA stenosis.  PMH TIA x 4 (most recent 3/21/23), HTN, RA.    [facility]  PT/OT  hold ASA/AC for now; pending stability 24 -hr CT  NSGy eval for severe ICA stenosis   resume statin  maintain SBP<180/105,restart home Metoprolol, hold the rest for now to avoid hypotension  maintain Osat >92%  diet as tolerated; BM regimen  UOP, e-lytes - monitor  SCDs  maintain -180, ISS

## 2023-03-23 NOTE — CONSULT NOTE ADULT - SUBJECTIVE AND OBJECTIVE BOX
HISTORY OF PRESENT ILLNESS:   73yF PMH TIA x 4 most recently 3/21/23, HTN, RA, presented to ED with left sided hemiparesis about 1 hour prior to arrival, s/p tenecteplase 19:04 3/22/23, CTA head and neck obtained found with severe TED stenosis, along with right supraclinoid ICA infundibula vs aneurysms. Patient seen, anxious, states she wants her stenosis treated given her TIA episodes. Denies headaches, dizziness, nausea, vomiting    PAST MEDICAL & SURGICAL HISTORY:  Hypertension  Rheumatoid arthritis  History of TIAs    SOCIAL HISTORY:  Tobacco Use: Denies  EtOH use: Denies  Substance: Denies    Allergies  No Known Allergies    REVIEW OF SYSTEMS  Negative except as noted in HPI    HOME MEDICATIONS:  Home Medications:  amLODIPine 10 mg oral tablet: 1 tab(s) orally once a day (22 Mar 2023 19:53)  cholecalciferol 125 mcg (5000 intl units) oral capsule: 1 cap(s) orally once a day (22 Mar 2023 19:53)  clopidogrel 75 mg oral tablet: 1 tab(s) orally once a day (22 Mar 2023 19:53)  diphenhydrAMINE 25 mg oral capsule: 1 cap(s) orally once a day (at bedtime), As Needed (22 Mar 2023 19:53)  gabapentin 100 mg oral capsule: 1 capsule (100 mG in the morning) and 2 capsules (200 mG) in the evening (22 Mar 2023 19:53)  melatonin: at bedtime (22 Mar 2023 19:53)  metoprolol succinate 100 mg oral tablet, extended release: 1 tab(s) orally once a day (22 Mar 2023 19:53)  Multiple Vitamins oral tablet: 1 tab(s) orally once a day (22 Mar 2023 19:53)  Orencia Prefilled Syringe: every month (22 Mar 2023 19:53)  oxyCODONE 10 mg oral tablet: 1 tab(s) orally every 6 hours, As Needed (22 Mar 2023 19:53)  rosuvastatin 10 mg oral tablet: 1 tab(s) orally once a day (22 Mar 2023 19:53)  valsartan 320 mg oral tablet: 1 tab(s) orally once a day (22 Mar 2023 19:53)  Vitamin B Complex oral tablet: 1 tab(s) orally once a day (22 Mar 2023 19:53)    MEDICATIONS:  Antibiotics:    Neuro:  acetaminophen     Tablet .. 650 milliGRAM(s) Oral every 6 hours PRN  gabapentin 100 milliGRAM(s) Oral <User Schedule>  gabapentin 200 milliGRAM(s) Oral <User Schedule>  oxyCODONE    IR 10 milliGRAM(s) Oral every 6 hours PRN  oxyCODONE    IR 5 milliGRAM(s) Oral every 6 hours PRN    Anticoagulation:    OTHER:  atorvastatin 20 milliGRAM(s) Oral at bedtime  chlorhexidine 2% Cloths 1 Application(s) Topical daily  metoprolol tartrate 50 milliGRAM(s) Oral two times a day  polyethylene glycol 3350 17 Gram(s) Oral two times a day  senna 2 Tablet(s) Oral at bedtime    IVF:  cholecalciferol 5000 Unit(s) Oral daily  multivitamin 1 Tablet(s) Oral daily    Vital Signs Last 24 Hrs  T(C): 37.1 (23 Mar 2023 12:15), Max: 37.1 (22 Mar 2023 21:00)  T(F): 98.7 (23 Mar 2023 12:15), Max: 98.8 (22 Mar 2023 21:00)  HR: 67 (23 Mar 2023 14:04) (62 - 87)  BP: 124/78 (23 Mar 2023 14:04) (100/73 - 157/70)  BP(mean): 91 (23 Mar 2023 14:04) (70 - 106)  RR: 15 (23 Mar 2023 14:04) (8 - 20)  SpO2: 91% (23 Mar 2023 14:04) (91% - 100%)    Parameters below as of 23 Mar 2023 12:04  Patient On (Oxygen Delivery Method): room air    PHYSICAL EXAM:  GENERAL: NAD, well-groomed  HEAD: Atraumatic, normocephalic  NECK: Supple  OSCAR COMA SCORE: E- 4 V- 5 M- 6=15  MENTAL STATUS: AAO x3; Appropriately conversant without aphasia; following commands  CRANIAL NERVES: PERRL. EOMI without nystagmus. Facial sensation intact V1-3 distribution b/l. Face symmetric w/ normal eye closure and smile, tongue midline. Hearing grossly intact. Speech clear  MOTOR: LUE + drift; LUE/LLE 4/5; RUE/RLE 5/5  SENSATION: grossly intact to light touch all extremities    LABS:                        10.9   5.10  )-----------( 159      ( 23 Mar 2023 02:16 )             32.8     03-23    142  |  105  |  15.7  ----------------------------<  104<H>  4.0   |  26.0  |  0.91    Ca    9.6      23 Mar 2023 02:16  Phos  2.8     03-23  Mg     1.9     03-23    TPro  6.6  /  Alb  3.9  /  TBili  0.4  /  DBili  x   /  AST  43<H>  /  ALT  35<H>  /  AlkPhos  64  03-22    PT/INR - ( 22 Mar 2023 18:38 )   PT: 12.1 sec;   INR: 1.04 ratio      PTT - ( 22 Mar 2023 18:38 )  PTT:26.5 sec    RADIOLOGY & ADDITIONAL STUDIES:  CT Head No Cont (03.22.23 @ 20:55)  IMPRESSION:  No significant interval change compared to the recent study.  No intracranial hemorrhage, mass effect or large acute cortical infarct.    CT Angio Neck w/ IV Cont (03.22.23 @ 19:01)  IMPRESSION:  CT perfusion:  CBF<30% volume: 0 ml  Tmax>6.0s volume: 0 ml  Mismatch volume: 0 ml  Mismatch ratio: None    CT angiography neck:  Retropharyngeal course of bilateral cervical ICAs.  Right carotid system: Severe stenosis of the proximal right ICA using   NASCET criteria.  Left carotid system: No hemodynamically significant stenosis using NASCET   criteria.    CT angiography brain:  1.  No large vessel occlusion.  2.  Two 1 mm conical inferiorly directed outpouchingsarising from the   right supraclinoid ICA favoring infundibula, less likely aneurysms (9:59).

## 2023-03-23 NOTE — PROGRESS NOTE ADULT - SUBJECTIVE AND OBJECTIVE BOX
HPI:  73 year old female with PMH TIA x 4 (most recent 3/21/23), HTN, RA presents with L sided weakness. Pt reports that 1 hour PTA she developed LUE weakness. She states that initially she had difficulty with getting her words out but that has improved back to baseline. No blurry vision, numbness, chets pain, fevers.  CT Head Negative. CTP Negative. NIH 2 on admission, TNK given @ 1904.   (22 Mar 2023 21:17)    O/n events: none reported.    ICU Vital Signs Last 24 Hrs  T(C): 37.1 (23 Mar 2023 12:15), Max: 37.1 (22 Mar 2023 21:00)  T(F): 98.7 (23 Mar 2023 12:15), Max: 98.8 (22 Mar 2023 21:00)  HR: 67 (23 Mar 2023 14:04) (62 - 87)  BP: 124/78 (23 Mar 2023 14:04) (100/73 - 157/70)  BP(mean): 91 (23 Mar 2023 14:04) (70 - 106)  ABP: --  ABP(mean): --  RR: 15 (23 Mar 2023 14:04) (8 - 20)  SpO2: 91% (23 Mar 2023 14:04) (91% - 100%)    O2 Parameters below as of 23 Mar 2023 12:04  Patient On (Oxygen Delivery Method): room air      Labs, imaging reviewed.    Exam:  AAOx3, EO spont, face symmetric, visual fields intact, EOMI, PERRLA, speech clear, comprehension seems intact, LUE 4/5 with mild drift, LLE 4/5 with drift, RUE/RLE 5/5 with no drift, sensation intact to LT.  CTAB  S1S2 present  Abd soft, NT, ND  No peripheral swelling.

## 2023-03-23 NOTE — CONSULT NOTE ADULT - ASSESSMENT
73yF PMH TIA x 4 most recently 3/21/23, HTN, RA, presented to ED with left sided hemiparesis about 1 hour prior to arrival, s/p tenecteplase 19:04 3/22/23, CTA head and neck obtained found with severe TED stenosis, along with right supraclinoid ICA infundibula vs aneurysms. neurosurgery consulted for potential CEA    PLAN:  - D/w Dr. Gomez  - Q1 neuro checks for now s/p tenecteplase  - Permissive HTN -180  - Start ASA 81 and resume plavix if 24 hours scan without ICH  - MR brain pending  - Carotid dopplers pending  - Further recommendations pending completed workup above  - Supportive care/further medical management per NSICU
INCOMPLETE      ASSESSMENT: 73 year old female with PMH TIA x 4 (most recent 3/21/23), HTN, RA presents with L sided weakness. Pt reports that 1 hour PTA she developed LUE weakness. She states that initially she had difficulty with getting her words out but that has improved back to baseline. N CT Head Negative. CTP Negative. NIH 2 on admission, TNK given @ 1904. Currently in NSICU. Follow up CT head at 1904 3/23/23.         NEURO: Continue close monitoring for neurologic deterioration, permissive HTN,with stroke checks q 1 hour, titrate statin to LDL goal less than 70, MRI Brain w/o, Physical therapy/OT/Speech eval/treatment. Anticoagulation would be determine post CT head    ANTITHROMBOTIC THERAPY: pending CT head results    PULMONARY:  protecting airway, saturating well on room air    CARDIOVASCULAR: check TTE, cardiac monitoring                            GASTROINTESTINAL:  dysphagia screen  complete     Diet: DASH diet    RENAL: BUN/Cr 15.7/0.91, monitor urine output      Na Goal: 135-145     Harrison:     HEMATOLOGY: H/H 10.9/32.8, Platelets 159     DVT ppx: Heparin s.c [] LMWH [] SCD    ID: afebrile, no leukocytosis, monitor for signs of infections    OTHER:  rheumotology    DISPOSITION: Rehab or home depending on PT eval once stable and workup is complete      CORE MEASURES:        Admission NIHSS:      TPA: [x] YES [] NO      LDL/HDL/A1C: 51/29/     Depression Screen:      Statin Therapy:     Dysphagia Screen: [] PASS [] FAIL     Smoking [] YES [] NO      Afib [] YES [] NO     Stroke Education [] YES [] NO    Obtain screening lower extremity venous ultrasound in patients who meet 1 or more of the following criteria as patient is high risk for DVT/PE on admission:   [] History of DVT/PE  []Hypercoagulable states (Factor V Leiden, Cancer, OCP, etc. )  []Prolonged immobility (hemiplegia/hemiparesis/post operative or any other extended immobilization)  [] Transferred from outside facility (Rehab or Long term care)  [] Age </= to 50
IMPRESSION:  -S/P  iv tenecteplase  R/O R MCA stroke      ASSESSMENT/ PLAN:   - Patient was informed that patient was having a stroke or it may be a stroke mimic. IV tenecteplase was recommended. All risks and benefits of tenecteplase including systemic bleeding and intracranial bleeding risks were discussed. Patient verbally consented for tenecteplase administration given the benefits outweigh the potential risks.  - Admit to Neuro ICU.  - Neuro checks and vital signs  as per post IV tenecteplase protocol  - SBP goal permissive but less than 180/105 mm Hg.  - No antiplatelets or anticoagulation for 24 hrs post IV tenecteplase.  - Lipitor  for LDL goal of < 70 when cleared by dysphagia screen.  - Telemetry monitoring.  - CT head 24 hrs post IV tenecteplase.  - MRI Brain stroke protocol when stable.  - Check fasting Lipid panel and HbA1c  - TTE with bubble study.  - PT OT SLP evaluation.  - SCD/  for DVT prophylaxis.

## 2023-03-23 NOTE — CONSULT NOTE ADULT - SUBJECTIVE AND OBJECTIVE BOX
INCOMPLETE                             [facility] Neurology Stroke Team Consult  CC: left side weakness  HPI:  73 year old female with PMH TIA x 4 (most recent 3/21/23), HTN, RA presents with L sided weakness. Pt reports that 1 hour PTA she developed LUE weakness. She states that initially she had difficulty with getting her words out but that has improved back to baseline. No blurry vision, numbness, chets pain, fevers.  CT Head Negative. CTP Negative. NIH 2 on admission, TNK given @ 1904. Neurology Stroke team called to evaluate.        PAST MEDICAL & SURGICAL HISTORY:  Hypertension  Rheumatoid arthritis  History of TIAs      MEDICATIONS  (STANDING):  chlorhexidine 2% Cloths 1 Application(s) Topical daily  sodium chloride 0.9%. 1000 milliLiter(s) (75 mL/Hr) IV Continuous <Continuous>    MEDICATIONS  (PRN):  oxyCODONE    IR 10 milliGRAM(s) Oral every 6 hours PRN Moderate Pain (4 - 6)    Allergies  No Known Allergies    SOCIAL HISTORY:  tob   alcohol   drugs    FAMILY HISTORY:        ROS: 14 point ROS negative other than what is present in HPI or below    Vital Signs Last 24 Hrs  T(C): 36.8 (23 Mar 2023 08:15), Max: 37.1 (22 Mar 2023 21:00)  T(F): 98.2 (23 Mar 2023 08:15), Max: 98.8 (22 Mar 2023 21:00)  HR: 65 (23 Mar 2023 08:04) (65 - 87)  BP: 121/59 (23 Mar 2023 08:04) (105/55 - 157/70)  BP(mean): 77 (23 Mar 2023 08:04) (70 - 106)  RR: 13 (23 Mar 2023 08:04) (8 - 20)  SpO2: 93% (23 Mar 2023 08:04) (92% - 100%)    Parameters below as of 23 Mar 2023 08:04  Patient On (Oxygen Delivery Method): room air        INCOMPLETE  General: NAD    Detailed Neurologic Exam:    Mental status: The patient is awake and alert and has normal attention span.  The patient is fully oriented in 3 spheres. The patient is oriented to current events. The patient is able to name objects, follow commands, repeat sentences.    Cranial nerves: Pupils equal and react symmetrically to light. There is no visual field deficit to confrontation. Extraocular motion is full with no nystagmus. There is no ptosis. Facial sensation is intact. Facial musculature is symmetric. Palate elevates symmetrically. Tongue is midline.    Motor: There is normal bulk and tone.  There is no tremor.  Strength is 5/5 in the right arm and leg.   Strength is 5/5 in the left arm and leg.    Sensation: Intact to light touch and pin in 4 extremities    Reflexes: 1-2+ throughout and plantar responses are flexor.    Cerebellar: There is no dysmetria on finger to nose testing.    Gait : deferred    [facility] SS:  DATE: 3/23/23  TIME:  1A: Level of consciousness (0-3):   1B: Questions (0-2):   1C: Commands (0-2):   2: Gaze (0-2):   3: Visual fields (0-3):   4: Facial palsy (0-3):   MOTOR:  5A: Left arm motor drift (0-4):   5B: Right arm motor drift (0-4):   6A: Left leg motor drift (0-4):   6B: Right leg motor drift (0-4):   7: Limb ataxia (0-2):   SENSORY:  8: Sensation (0-2):   SPEECH:  9: Language (0-3):   10: Dysarthria (0-2):   EXTINCTION:  11: Extinction/inattention (0-2):     TOTAL SCORE:     prehospital mRS=0      LABS:                         10.9   5.10  )-----------( 159      ( 23 Mar 2023 02:16 )             32.8       03-23    142  |  105  |  15.7  ----------------------------<  104<H>  4.0   |  26.0  |  0.91    Ca    9.6      23 Mar 2023 02:16  Phos  2.8     03-23  Mg     1.9     03-23    TPro  6.6  /  Alb  3.9  /  TBili  0.4  /  DBili  x   /  AST  43<H>  /  ALT  35<H>  /  AlkPhos  64  03-22      PT/INR - ( 22 Mar 2023 18:38 )   PT: 12.1 sec;   INR: 1.04 ratio         PTT - ( 22 Mar 2023 18:38 )  PTT:26.5 sec    Lipid panel:    HgbA1c:      RADIOLOGY & ADDITIONAL STUDIES (independently reviewed unless otherwise noted):     CT Angio Neck w/ IV Cont (03.22.23 @ 19:01)   IMPRESSION  CT perfusion:  CBF<30% volume: 0 ml  Tmax>6.0s volume: 0 ml  Mismatch volume: 0 ml  Mismatch ratio: None    CT angiography neck:  Retropharyngeal course of bilateral cervical ICAs.  Right carotid system: Severe stenosis of the proximal right ICA using   NASCET criteria.  Left carotid system: No hemodynamically significant stenosis using NASCET   criteria.    CT angiography brain:  1.  No large vessel occlusion.  2.  Two 1 mm conical inferiorly directed outpouchingsarising from the   right supraclinoid ICA favoring infundibula, less likely aneurysms (9:59).

## 2023-03-24 LAB
ANION GAP SERPL CALC-SCNC: 11 MMOL/L — SIGNIFICANT CHANGE UP (ref 5–17)
BUN SERPL-MCNC: 13.8 MG/DL — SIGNIFICANT CHANGE UP (ref 8–20)
CALCIUM SERPL-MCNC: 10.1 MG/DL — SIGNIFICANT CHANGE UP (ref 8.4–10.5)
CHLORIDE SERPL-SCNC: 103 MMOL/L — SIGNIFICANT CHANGE UP (ref 96–108)
CO2 SERPL-SCNC: 26 MMOL/L — SIGNIFICANT CHANGE UP (ref 22–29)
CREAT SERPL-MCNC: 0.86 MG/DL — SIGNIFICANT CHANGE UP (ref 0.5–1.3)
EGFR: 71 ML/MIN/1.73M2 — SIGNIFICANT CHANGE UP
GLUCOSE SERPL-MCNC: 110 MG/DL — HIGH (ref 70–99)
HCT VFR BLD CALC: 35.2 % — SIGNIFICANT CHANGE UP (ref 34.5–45)
HGB BLD-MCNC: 11.5 G/DL — SIGNIFICANT CHANGE UP (ref 11.5–15.5)
MAGNESIUM SERPL-MCNC: 1.8 MG/DL — SIGNIFICANT CHANGE UP (ref 1.8–2.6)
MCHC RBC-ENTMCNC: 29.5 PG — SIGNIFICANT CHANGE UP (ref 27–34)
MCHC RBC-ENTMCNC: 32.7 GM/DL — SIGNIFICANT CHANGE UP (ref 32–36)
MCV RBC AUTO: 90.3 FL — SIGNIFICANT CHANGE UP (ref 80–100)
PHOSPHATE SERPL-MCNC: 3.3 MG/DL — SIGNIFICANT CHANGE UP (ref 2.4–4.7)
PLATELET # BLD AUTO: 154 K/UL — SIGNIFICANT CHANGE UP (ref 150–400)
POTASSIUM SERPL-MCNC: 3.9 MMOL/L — SIGNIFICANT CHANGE UP (ref 3.5–5.3)
POTASSIUM SERPL-SCNC: 3.9 MMOL/L — SIGNIFICANT CHANGE UP (ref 3.5–5.3)
RBC # BLD: 3.9 M/UL — SIGNIFICANT CHANGE UP (ref 3.8–5.2)
RBC # FLD: 13.2 % — SIGNIFICANT CHANGE UP (ref 10.3–14.5)
SODIUM SERPL-SCNC: 140 MMOL/L — SIGNIFICANT CHANGE UP (ref 135–145)
WBC # BLD: 3.98 K/UL — SIGNIFICANT CHANGE UP (ref 3.8–10.5)
WBC # FLD AUTO: 3.98 K/UL — SIGNIFICANT CHANGE UP (ref 3.8–10.5)

## 2023-03-24 PROCEDURE — 70553 MRI BRAIN STEM W/O & W/DYE: CPT | Mod: 26

## 2023-03-24 PROCEDURE — 99233 SBSQ HOSP IP/OBS HIGH 50: CPT

## 2023-03-24 RX ORDER — MAGNESIUM SULFATE 500 MG/ML
1 VIAL (ML) INJECTION ONCE
Refills: 0 | Status: COMPLETED | OUTPATIENT
Start: 2023-03-24 | End: 2023-03-24

## 2023-03-24 RX ORDER — CLOPIDOGREL BISULFATE 75 MG/1
75 TABLET, FILM COATED ORAL DAILY
Refills: 0 | Status: DISCONTINUED | OUTPATIENT
Start: 2023-03-24 | End: 2023-03-25

## 2023-03-24 RX ORDER — POTASSIUM CHLORIDE 20 MEQ
40 PACKET (EA) ORAL ONCE
Refills: 0 | Status: COMPLETED | OUTPATIENT
Start: 2023-03-24 | End: 2023-03-24

## 2023-03-24 RX ADMIN — CLOPIDOGREL BISULFATE 75 MILLIGRAM(S): 75 TABLET, FILM COATED ORAL at 17:33

## 2023-03-24 RX ADMIN — Medication 50 MILLIGRAM(S): at 05:19

## 2023-03-24 RX ADMIN — OXYCODONE HYDROCHLORIDE 10 MILLIGRAM(S): 5 TABLET ORAL at 05:35

## 2023-03-24 RX ADMIN — OXYCODONE HYDROCHLORIDE 10 MILLIGRAM(S): 5 TABLET ORAL at 00:35

## 2023-03-24 RX ADMIN — OXYCODONE HYDROCHLORIDE 5 MILLIGRAM(S): 5 TABLET ORAL at 09:29

## 2023-03-24 RX ADMIN — Medication 40 MILLIEQUIVALENT(S): at 08:01

## 2023-03-24 RX ADMIN — Medication 1 TABLET(S): at 11:36

## 2023-03-24 RX ADMIN — OXYCODONE HYDROCHLORIDE 10 MILLIGRAM(S): 5 TABLET ORAL at 18:06

## 2023-03-24 RX ADMIN — CHLORHEXIDINE GLUCONATE 1 APPLICATION(S): 213 SOLUTION TOPICAL at 11:39

## 2023-03-24 RX ADMIN — OXYCODONE HYDROCHLORIDE 10 MILLIGRAM(S): 5 TABLET ORAL at 06:35

## 2023-03-24 RX ADMIN — GABAPENTIN 100 MILLIGRAM(S): 400 CAPSULE ORAL at 08:01

## 2023-03-24 RX ADMIN — OXYCODONE HYDROCHLORIDE 5 MILLIGRAM(S): 5 TABLET ORAL at 10:29

## 2023-03-24 RX ADMIN — Medication 81 MILLIGRAM(S): at 11:35

## 2023-03-24 RX ADMIN — OXYCODONE HYDROCHLORIDE 10 MILLIGRAM(S): 5 TABLET ORAL at 17:33

## 2023-03-24 RX ADMIN — OXYCODONE HYDROCHLORIDE 10 MILLIGRAM(S): 5 TABLET ORAL at 11:59

## 2023-03-24 RX ADMIN — ATORVASTATIN CALCIUM 20 MILLIGRAM(S): 80 TABLET, FILM COATED ORAL at 21:19

## 2023-03-24 RX ADMIN — Medication 5 MILLIGRAM(S): at 21:19

## 2023-03-24 RX ADMIN — GABAPENTIN 200 MILLIGRAM(S): 400 CAPSULE ORAL at 21:19

## 2023-03-24 RX ADMIN — ENOXAPARIN SODIUM 40 MILLIGRAM(S): 100 INJECTION SUBCUTANEOUS at 11:39

## 2023-03-24 RX ADMIN — Medication 100 GRAM(S): at 05:19

## 2023-03-24 RX ADMIN — Medication 50 MILLIGRAM(S): at 17:33

## 2023-03-24 RX ADMIN — OXYCODONE HYDROCHLORIDE 10 MILLIGRAM(S): 5 TABLET ORAL at 23:12

## 2023-03-24 RX ADMIN — Medication 5000 UNIT(S): at 11:35

## 2023-03-24 NOTE — PROGRESS NOTE ADULT - NS ATTEND AMEND GEN_ALL_CORE FT
Agree with above.     Stroke, status post IV thrombolysis.     - Repeat imaging.   -Antiplatelet after 24 h from IV thrombolysis if no hemorrhage.   LDL: 51  At goal of < 70  On statin.   -Check Carotid duplex.   -Possible neuro-IR evaluation after above imaging.     Case discussed with neuro-ICU team.
Agree with above.     Repeat CT head images reviewed (and concur with report): There is no acute pathology.   Carotid duplex with calcified plaque at origin of right ICA with narrowing of > 50%.    Stroke, status post IV thrombolysis.     On dual antiplatelet agent.   LDL: 51  At goal of < 70  On statin.   -Check Carotid duplex.   -Possible neuro-IR evaluation after above imaging.     Case discussed with neuro-ICU team (Dr Reddy attending).

## 2023-03-24 NOTE — PHYSICAL THERAPY INITIAL EVALUATION ADULT - ADDITIONAL COMMENTS
Pt lives alone in a private home. 3 steps to enter with bilateral handrails, 0 steps inside. Pt was independent PTA without an assist device. Pt owns a RW.

## 2023-03-24 NOTE — DIETITIAN INITIAL EVALUATION ADULT - OTHER INFO
73yF PMH TIA x 4 most recently 3/21/23, HTN, RA, presented to ED with left sided hemiparesis about 1 hour prior to arrival, s/p tenecteplase 19:04 3/22/23, CTA head and neck obtained found with severe TED stenosis, along with right supraclinoid ICA infundibula vs aneurysms.    Nutrition assessment completed at bedside. Pt reports eating well prior to admission, states she typically consumes two meals and snacks throughout the day. No diet restrictions at home. Reports weight has been stable within the last year. Pt reports appetite/po intake is good at this time. States she ate well for breakfast, denies any chewing/swallowing difficulties. Diet education deferred at this time. Encouraged HBV protein sources. RD to follow up.

## 2023-03-24 NOTE — PROGRESS NOTE ADULT - ASSESSMENT
73y/oF PMH TIA x 4 (most recent 3/21/23), HTN, RA presents with L sided weakness. Pt reports that 1 hour PTA she developed LUE weakness. She states that initially she had difficulty with getting her words out but that has improved back to baseline. No blurry vision, numbness, chest pain, fevers.  CT Head Negative. CTP Negative. NIH 2 on admission, TNK given @ 1904. Admitted to NSICU 3/22. 24hr repeat CTH stable. Pt also found to have severe ICA stenosis on prior imaging. Pt downgraded to tele 3/24    TIA   s/p tenecteplase   Severe R ICA stenosis   -previous MRI (3/21) neg for acute infarction  -CTH 3/22: no intracranial hemorrhage, mass effect or large acute cortical infarct   -CT perfusion/CTA head and neck: severe stenosis of proximal R ICA, no LVO, 2 1mm outpouchings from R supraclinoid ICA favoring infundibula, less likely aneurysms  -US carotid: calcified plaque at both carotid bifurcations and ICA, R ICA likely exceeds 50%  -repeat CTH 24hr post-TNK stable   -TTE LVEF 60-65%, grade I ddx  -LDL 51  -a1c 6.1  -asa, statin  -metoprolol resumed   -neuro following   -neurosx following for poss CEA in the future (not during this admission)   -PT: no skilled needs  -speech eval appreciated   -MRI pending     HTN   -cont metoprolol 50mg bid   -holding home amlodipine and valsartan     RA  -cont gabapentin   -cont pain control     vte ppx: lovenox sq

## 2023-03-24 NOTE — SPEECH LANGUAGE PATHOLOGY EVALUATION - SLP FLUENCY
reduced; 8 words per minute: however, pt somewhat self-limiting during task, stating: I know this is tough for me & acknowledged she shut down feeling stressed with task

## 2023-03-24 NOTE — PHYSICAL THERAPY INITIAL EVALUATION ADULT - PERTINENT HX OF CURRENT PROBLEM, REHAB EVAL
73 year old female with PMH TIA x 4 (most recent 3/21/23), HTN, RA presented with Left sided weakness. Pt states that initially she had difficulty with getting her words out but that has improved back to baseline. No blurry vision, numbness, chest pain, fevers. pt was noted to have severe ICA stenosis upon her last visit for TIA 3/21/23. CTA head/ neck  without large vessel occlusion therefor not thrombectomy candidate. Pt was a candidate and received tenecteplase at 1904 on 3/22/23.

## 2023-03-24 NOTE — SPEECH LANGUAGE PATHOLOGY EVALUATION - SLP PERTINENT HISTORY OF CURRENT PROBLEM
Pt reports h/o periods of word finding difficulties & recall PTA (she further endorsed being evaluated for Alzheimer's in which work-up was negative & was told her difficulties are stress-related)

## 2023-03-24 NOTE — DIETITIAN INITIAL EVALUATION ADULT - PERTINENT MEDS FT
MEDICATIONS  (STANDING):  aspirin  chewable 81 milliGRAM(s) Oral daily  atorvastatin 20 milliGRAM(s) Oral at bedtime  chlorhexidine 2% Cloths 1 Application(s) Topical daily  cholecalciferol 5000 Unit(s) Oral daily  enoxaparin Injectable 40 milliGRAM(s) SubCutaneous every 24 hours  gabapentin 100 milliGRAM(s) Oral <User Schedule>  gabapentin 200 milliGRAM(s) Oral <User Schedule>  melatonin 5 milliGRAM(s) Oral at bedtime  metoprolol tartrate 50 milliGRAM(s) Oral two times a day  multivitamin 1 Tablet(s) Oral daily  polyethylene glycol 3350 17 Gram(s) Oral two times a day  senna 2 Tablet(s) Oral at bedtime    MEDICATIONS  (PRN):  acetaminophen     Tablet .. 650 milliGRAM(s) Oral every 6 hours PRN Mild Pain (1 - 3)  oxyCODONE    IR 10 milliGRAM(s) Oral every 6 hours PRN Severe Pain (7 - 10)  oxyCODONE    IR 5 milliGRAM(s) Oral every 6 hours PRN Moderate Pain (4 - 6)

## 2023-03-24 NOTE — DIETITIAN NUTRITION RISK NOTIFICATION - ADDITIONAL COMMENTS/DIETITIAN RECOMMENDATIONS
Change diet to consistent carbohydrate (evening snack); DASH/TLC (sodium and cholesterol restricted diet).  Continue MVI and vitamin D supplementation. Continue bowel regimen PRN. Encourage po intake, monitor diet tolerance, and provide assistance at meals as needed. Obtain daily weights to monitor trends.

## 2023-03-24 NOTE — DIETITIAN INITIAL EVALUATION ADULT - PERTINENT LABORATORY DATA
03-24    140  |  103  |  13.8  ----------------------------<  110<H>  3.9   |  26.0  |  0.86    Ca    10.1      24 Mar 2023 03:21  Phos  3.3     03-24  Mg     1.8     03-24    TPro  6.6  /  Alb  3.9  /  TBili  0.4  /  DBili  x   /  AST  43<H>  /  ALT  35<H>  /  AlkPhos  64  03-22  A1C with Estimated Average Glucose Result: 6.1 % (03-23-23 @ 02:16)

## 2023-03-24 NOTE — PROGRESS NOTE ADULT - ASSESSMENT
Assessment and Plan:   · Assessment	  ASSESSMENT:  73 year old female with PMH TIA x 4 (most recent 3/21/23), HTN, RA presented with Left sided weakness and aphasia. NIH 2. Was a candidate and received tenecteplase at 1904 on 3/22/23.  CTA head/ neck  without large vessel occlusion therefor not thrombectomy candidate. Additionally noted,  Two 1 mm conical inferiorly directed out pouchings arising from the  right supraclinoid ICA favoring infundibula, less likely aneurysms. Right carotid severe stenosis. Rule out cerebral infarction, possibly symptomatic large artery atherosclerotic disease.     NEURO: Continue close monitoring for neurologic deterioration, with stroke check q 1 hour. Permissive HTN,  currently neurologically stable at  110-130 mmHg. Home statin regimen: LDL at goal of < 70,  MRI Brain w/o, carotid doppler as noted  Neuro IR consult for evaluation and consideration in carotid revascularization pending findings of noted workup.  Physical therapy/OT/Speech eval/treatment.     ANTITHROMBOTIC THERAPY: ASA 81 mg     PULMONARY:  protecting airway, saturating well     CARDIOVASCULAR: check TTE, cardiac monitoring                             GASTROINTESTINAL:  dysphagia screen passed, advance as tolerated      Diet: DASH diet    RENAL: BUN/Cr 13.8/0.86, monitor urine output , maintain adequate hydration      Na Goal:  135-145     Harrison: N    HEMATOLOGY: H/H 11.5/35.2, Platelets 154, patient should have all age and risk appropriate malignancy screening     DVT ppx: Heparin s.c [] LMWH [x]    ID: afebrile, no leukocytosis, monitor for infection    ENDOCR: A1C 6.1,  diabetic/nutrition educator- diet/lifestyle modifications , continue risk factor control of DM, HTN, HLD.    DISPOSITION: Rehab or home depending on PT eval once stable and workup is complete      CORE MEASURES:        Admission NIHSS: 2     Tenecteplase : [x] YES [] NO      LDL/HDL/A1C: 51/29/6.1     Depression Screen: P     Statin Therapy: Atorvastatin 20 mg daily      Dysphagia Screen: [x] PASS [] FAIL     Smoking [] YES [x] NO      Afib [] YES [x] NO     Stroke Education [x] YES [] NO     ASSESSMENT:  73 year old female with PMH TIA x 4 (most recent 3/21/23), HTN, RA presented with Left sided weakness and aphasia. NIH 2. Was a candidate and received tenecteplase at 1904 on 3/22/23.  CTA head/ neck  without large vessel occlusion therefor not thrombectomy candidate. Additionally noted,  Two 1 mm conical inferiorly directed out pouchings arising from the  right supraclinoid ICA favoring infundibula, less likely aneurysms. Right carotid severe stenosis. Rule out cerebral infarction, possibly symptomatic large artery atherosclerotic disease.     NEURO: Continue close monitoring for neurologic deterioration, with stroke check q 2 hour. Permissive HTN,  currently neurologically stable at  110-130 mmHg. Home statin regimen: LDL at goal of < 70,  MRI Brain w/o pending, carotid doppler as noted. Will recommend DOAC pending MRI result. Physical therapy/OT/Speech eval/treatment.     ANTITHROMBOTIC THERAPY: ASA 81 mg     PULMONARY:  protecting airway, saturating well     CARDIOVASCULAR: TTE as noted, cardiac monitoring, screen for Afib                         GASTROINTESTINAL:  dysphagia screen passed, advance as tolerated      Diet: DASH diet    RENAL: BUN/Cr 13.8/0.86, monitor urine output , maintain adequate hydration      Na Goal:  135-145     Harrison: N    HEMATOLOGY: H/H 11.5/35.2, Platelets 154, patient should have all age and risk appropriate malignancy screening     DVT ppx: Heparin s.c [] LMWH [x]    ID: afebrile, no leukocytosis, monitor for infection    ENDOCR: A1C 6.1,  diabetic/nutrition educator- diet/lifestyle modifications , continue risk factor control of DM, HTN, HLD. follow up with neurology outpatient    DISPOSITION: Rehab or home depending on PT eval once stable and workup is complete      CORE MEASURES:        Admission NIHSS: 2     Tenecteplase : [x] YES [] NO      LDL/HDL/A1C: 51/29/6.1     Depression Screen: P     Statin Therapy: Atorvastatin 20 mg daily      Dysphagia Screen: [x] PASS [] FAIL     Smoking [] YES [x] NO      Afib [] YES [x] NO     Stroke Education [x] YES [] NO

## 2023-03-24 NOTE — CHART NOTE - NSCHARTNOTEFT_GEN_A_CORE
73 year old female, PMH RA, HTN, TIA X 4 (most recent 2 days prior to this admission), presented to ED with left sided weakness on 3/22, received tenecteplase at 19:04 for NIH 2, mRs 0.   CTA negative for LVO.   CTA showed intracranial atherosclerosis with severe proximal TED stenosis.   Carotid US shows indeterminate likely > 50% stenosis TED  24 hour post tenecteplase CTH shows no hemorrhage      Patient's exam has remained stable and is as follows:     PHYSICAL EXAM:    General: No Acute Distress, sitting up in chair   Neurological: Awake, alert & oriented to person, place and time, Following Commands, PERRL, EOMI, Visual fields intact, Face Symmetric, Speech Fluent, Moving all extremities   Muscle Strength: RUE: 5/5 LUE: 4+/5 RLE: 5/5 LLE: 4+/5  mild drift LUE   Sensation Intact to Light Touch   Cerebellar: There is no dysmetria on finger to nose testing.  Gait : deferred  Pulmonary: non labored breathing, no use of accessory muscles  Cardiovascular:  Regular Rate and Rhythm   Gastrointestinal: Soft, Nontender, Nondistended       Plan: Downgrader to medicine    Neuro:              	Q4 hour Neuro checks on telemetry  	Stroke Core Measures: HgA1C 6.1, LDL 51                 MRI pending    	Pain management: Patient takes Oxycodone 10 mg q6h at home for chronic pain of RA, checked ISTOP, continue prn: Tylenol/Oxy5/Oxy 10                Gabapentin 100 mg am, 200 mg pm (home medication)                Melatonin 5 mg qhs                 General neurology to follow                 Neurosurgery to follow for possible CEA      	PT- rec home no needs  	  CV:  	SBP Goal 100-180                Home Metoprolol 50 mg bid  	Atorvastatin 20 mg qhs/LDL 51     Pulm:  	Room Air/ No distress  	  GI:  	Nutrition: DASH LBM: outpatient                Senna/miralax                MVI/Vitamin D  	  Gu:  	Harrison / Voiding / Condom Cath / Pure-wick  	I&O Q1 hour  	Monitor Electrolytes & Renal Function    Heme:  	Restarted ASA 81 mg qd/Plavix 75 mg qd  	Chemical DVT prophylaxis: Lovenox 40 mg qd                Mechanical DVT Prophylaxis: Maintain B/L LE sequential compression devices  		  ID:  	No issues    Endo  	Monitor BGL, maintain <180  	JACQUI   		100-150 no correction  		150-200  2units  		200-250  4units  		250-300	 6units  		300-350  8units  		350-400  10units  		400+	12units         Patient stable for downgrade to telemetry medical floor. Patient signed out to hospitalist Dr. Aguirre. Discussed diagnosis, interventions, testing, results and pending workup. All questions answered.

## 2023-03-24 NOTE — DIETITIAN INITIAL EVALUATION ADULT - ADD RECOMMEND
Continue MVI and vitamin D supplementation. Continue bowel regimen PRN. Encourage po intake, monitor diet tolerance, and provide assistance at meals as needed. Obtain daily weights to monitor trends.

## 2023-03-24 NOTE — PROGRESS NOTE ADULT - SUBJECTIVE AND OBJECTIVE BOX
Preliminary note, offical recommendations pending attending review/signature   INCOMPLETE           [facility] Neurology Stroke Team    CC: left side weakness, aphasia  HPI: 73 year old female with PMH TIA x 4 (most recent 3/21/23), HTN, RA presented with L sided weakness. Pt reports that 1 hour PTA she developed LUE weakness. She states that initially she had difficulty with getting her words out but that has improved back to baseline. No blurry vision, numbness, chest pain, fevers.   Of note, pt was noted to have severe ICA stenosis upon her last visit for TIA 3/21/23.      SUBJECTIVE: No events overnight.  No new neurologic complaints.  ROS reported negative unless otherwise noted.    acetaminophen     Tablet .. 650 milliGRAM(s) Oral every 6 hours PRN  aspirin  chewable 81 milliGRAM(s) Oral daily  atorvastatin 20 milliGRAM(s) Oral at bedtime  chlorhexidine 2% Cloths 1 Application(s) Topical daily  cholecalciferol 5000 Unit(s) Oral daily  enoxaparin Injectable 40 milliGRAM(s) SubCutaneous every 24 hours  gabapentin 100 milliGRAM(s) Oral <User Schedule>  gabapentin 200 milliGRAM(s) Oral <User Schedule>  melatonin 5 milliGRAM(s) Oral at bedtime  metoprolol tartrate 50 milliGRAM(s) Oral two times a day  multivitamin 1 Tablet(s) Oral daily  oxyCODONE    IR 10 milliGRAM(s) Oral every 6 hours PRN  oxyCODONE    IR 5 milliGRAM(s) Oral every 6 hours PRN  polyethylene glycol 3350 17 Gram(s) Oral two times a day  potassium chloride    Tablet ER 40 milliEquivalent(s) Oral once  senna 2 Tablet(s) Oral at bedtime      PHYSICAL EXAM:   Vital Signs Last 24 Hrs  T(C): 37 (24 Mar 2023 04:21), Max: 37.1 (23 Mar 2023 12:15)  T(F): 98.6 (24 Mar 2023 04:21), Max: 98.7 (23 Mar 2023 12:15)  HR: 64 (24 Mar 2023 07:00) (62 - 88)  BP: 122/71 (24 Mar 2023 07:00) (100/73 - 144/73)  BP(mean): 85 (24 Mar 2023 07:00) (77 - 110)  RR: 15 (24 Mar 2023 07:00) (12 - 21)  SpO2: 94% (24 Mar 2023 07:00) (91% - 100%)    Parameters below as of 24 Mar 2023 06:00  Patient On (Oxygen Delivery Method): room air      INCOMPLETE  General: No acute distress    NEUROLOGICAL EXAM:    Mental status: Awake, alert, oriented x3, speech fluent, follows commands, no neglect, normal memory , follows commands, IDs objects, repetition intact   Cranial Nerves: No facial asymmetry, no nystagmus, no dysarthria,  tongue midline  Motor exam: Normal tone, subtle drift left upper and left lower extremities,  5/5 RUE, 5/5 RLE, 4/5 LUE, 4/5 LLE, normal fine finger movements.  Sensation: Intact to light touch   Coordination/ Gait: No dysmetria, gait not tested          LABS:                        11.5   3.98  )-----------( 154      ( 24 Mar 2023 03:21 )             35.2    03-24    140  |  103  |  13.8  ----------------------------<  110<H>  3.9   |  26.0  |  0.86    Ca    10.1      24 Mar 2023 03:21  Phos  3.3     03-24  Mg     1.8     03-24    TPro  6.6  /  Alb  3.9  /  TBili  0.4  /  DBili  x   /  AST  43<H>  /  ALT  35<H>  /  AlkPhos  64  03-22  PT/INR - ( 22 Mar 2023 18:38 )   PT: 12.1 sec;   INR: 1.04 ratio         PTT - ( 22 Mar 2023 18:38 )  PTT:26.5 sec      IMAGING: Reviewed by me.   US Duplex Carotid Arteries Complete, Bilateral (03.23.23 @ 16:34)   IMPRESSION: Calcified plaque at the origin of the right internalcarotid   artery. Although measured blood flow velocities are borderline, the   degree of luminal narrowing likely exceeds 50% by diameter.    Calcified plaque in the left internal carotid artery without duplex   evidence of hemodynamically significant stenosis.    Measurement of carotid stenosis is based on velocity parameters that   correlate the residual internal carotid diameter with that of the more   distal vessel in accordance with a method such as the [facility]    US Duplex Venous Lower Ext Complete, Bilateral (03.23.23 @ 16:33)   IMPRESSION:  No evidence of deep venous thrombosis in either lower extremity.    CT Angio Neck w/ IV Cont (03.22.23 @ 19:01)   CT perfusion:  CBF<30% volume: 0 ml  Tmax>6.0s volume: 0 ml  Mismatch volume: 0 ml  Mismatch ratio: None    CT angiography neck:  Retropharyngeal course of bilateral cervical ICAs.  Right carotid system: Severe stenosis of the proximal right ICA using   NASCET criteria.  Left carotid system: No hemodynamically significant stenosis using NASCET   criteria.    CT angiography brain:  1.  No large vessel occlusion.  2.  Two 1 mm conical inferiorly directed outpouchingsarising from the   right supraclinoid ICA favoring infundibula, less likely aneurysms (9:59).    MR Head No Cont (03.21.23 @ 08:25)   IMPRESSION: No acute infarction.   Preliminary note, offical recommendations pending attending review/signature              [facility] Neurology Stroke Team    CC: left side weakness, aphasia  HPI: 73 year old female with PMH TIA x 4 (most recent 3/21/23), HTN, RA presented with L sided weakness. Pt reports that 1 hour PTA she developed LUE weakness. She states that initially she had difficulty with getting her words out but that has improved back to baseline. No blurry vision, numbness, chest pain, fevers.   Of note, pt was noted to have severe ICA stenosis upon her last visit for TIA 3/21/23.      SUBJECTIVE: No events overnight.  No new neurologic complaints.  ROS reported negative unless otherwise noted.    acetaminophen     Tablet .. 650 milliGRAM(s) Oral every 6 hours PRN  aspirin  chewable 81 milliGRAM(s) Oral daily  atorvastatin 20 milliGRAM(s) Oral at bedtime  chlorhexidine 2% Cloths 1 Application(s) Topical daily  cholecalciferol 5000 Unit(s) Oral daily  enoxaparin Injectable 40 milliGRAM(s) SubCutaneous every 24 hours  gabapentin 100 milliGRAM(s) Oral <User Schedule>  gabapentin 200 milliGRAM(s) Oral <User Schedule>  melatonin 5 milliGRAM(s) Oral at bedtime  metoprolol tartrate 50 milliGRAM(s) Oral two times a day  multivitamin 1 Tablet(s) Oral daily  oxyCODONE    IR 10 milliGRAM(s) Oral every 6 hours PRN  oxyCODONE    IR 5 milliGRAM(s) Oral every 6 hours PRN  polyethylene glycol 3350 17 Gram(s) Oral two times a day  potassium chloride    Tablet ER 40 milliEquivalent(s) Oral once  senna 2 Tablet(s) Oral at bedtime      PHYSICAL EXAM:   Vital Signs Last 24 Hrs  T(C): 37 (24 Mar 2023 04:21), Max: 37.1 (23 Mar 2023 12:15)  T(F): 98.6 (24 Mar 2023 04:21), Max: 98.7 (23 Mar 2023 12:15)  HR: 64 (24 Mar 2023 07:00) (62 - 88)  BP: 122/71 (24 Mar 2023 07:00) (100/73 - 144/73)  BP(mean): 85 (24 Mar 2023 07:00) (77 - 110)  RR: 15 (24 Mar 2023 07:00) (12 - 21)  SpO2: 94% (24 Mar 2023 07:00) (91% - 100%)    Parameters below as of 24 Mar 2023 06:00  Patient On (Oxygen Delivery Method): room air      General: No acute distress      NEUROLOGICAL EXAM:    Mental status: Awake, alert, oriented x3, speech fluent, follows commands, no neglect, normal memory , follows commands, IDs objects, repetition intact   Cranial Nerves: No facial asymmetry, no nystagmus, no dysarthria,  tongue midline  Motor exam: Normal tone, subtle drift left upper and left lower extremities,  5/5 RUE, 5/5 RLE, 4+/5 LUE, 4+/5 LLE, normal fine finger movements.  Sensation: Intact to light touch   Coordination/ Gait: No dysmetria, gait not tested          LABS:                        11.5   3.98  )-----------( 154      ( 24 Mar 2023 03:21 )             35.2    03-24    140  |  103  |  13.8  ----------------------------<  110<H>  3.9   |  26.0  |  0.86    Ca    10.1      24 Mar 2023 03:21  Phos  3.3     03-24  Mg     1.8     03-24    TPro  6.6  /  Alb  3.9  /  TBili  0.4  /  DBili  x   /  AST  43<H>  /  ALT  35<H>  /  AlkPhos  64  03-22  PT/INR - ( 22 Mar 2023 18:38 )   PT: 12.1 sec;   INR: 1.04 ratio         PTT - ( 22 Mar 2023 18:38 )  PTT:26.5 sec      IMAGING: Reviewed by me.     TTE Echo Complete w/ Contrast w/ Doppler (03.23.23 @ 14:06)   Summary:   1. Left ventricular ejection fraction, by visual estimation, is 60 to   65%.   2. Normal global left ventricular systolic function.   3. Normal left ventricular internal cavity size.   4. Spectral Doppler shows impaired relaxation pattern of left   ventricular myocardial filling (Grade I diastolic dysfunction).   5. Normal left atrial size.   6. Normal right atrial size.   7. There is no evidence of pericardial effusion.   8. Trace mitral valve regurgitation.   9. Mild aortic regurgitation.  10. Sclerotic aortic valve with normal opening.    CT Head No Cont (03.23.23 @ 18:43)   No evidence of an acute intracranial hemorrhage    US Duplex Carotid Arteries Complete, Bilateral (03.23.23 @ 16:34)   IMPRESSION: Calcified plaque at the origin of the right internalcarotid   artery. Although measured blood flow velocities are borderline, the   degree of luminal narrowing likely exceeds 50% by diameter.    Calcified plaque in the left internal carotid artery without duplex   evidence of hemodynamically significant stenosis.    Measurement of carotid stenosis is based on velocity parameters that   correlate the residual internal carotid diameter with that of the more   distal vessel in accordance with a method such as the [facility]    US Duplex Venous Lower Ext Complete, Bilateral (03.23.23 @ 16:33)   IMPRESSION:  No evidence of deep venous thrombosis in either lower extremity.    CT Angio Neck w/ IV Cont (03.22.23 @ 19:01)   CT perfusion:  CBF<30% volume: 0 ml  Tmax>6.0s volume: 0 ml  Mismatch volume: 0 ml  Mismatch ratio: None    CT angiography neck:  Retropharyngeal course of bilateral cervical ICAs.  Right carotid system: Severe stenosis of the proximal right ICA using   NASCET criteria.  Left carotid system: No hemodynamically significant stenosis using NASCET   criteria.    CT angiography brain:  1.  No large vessel occlusion.  2.  Two 1 mm conical inferiorly directed outpouchingsarising from the   right supraclinoid ICA favoring infundibula, less likely aneurysms (9:59).    MR Head No Cont (03.21.23 @ 08:25)   IMPRESSION: No acute infarction.              [facility] Neurology Stroke Team    CC: left side weakness, aphasia  HPI: 73 year old female with PMH TIA x 4 (most recent 3/21/23), HTN, RA presented with L sided weakness. Pt reports that 1 hour PTA she developed LUE weakness. She states that initially she had difficulty with getting her words out but that has improved back to baseline. No blurry vision, numbness, chest pain, fevers.   Of note, pt was noted to have severe ICA stenosis upon her last visit for TIA 3/21/23.      SUBJECTIVE: No events overnight.  No new neurologic complaints.  ROS reported negative unless otherwise noted.    acetaminophen     Tablet .. 650 milliGRAM(s) Oral every 6 hours PRN  aspirin  chewable 81 milliGRAM(s) Oral daily  atorvastatin 20 milliGRAM(s) Oral at bedtime  chlorhexidine 2% Cloths 1 Application(s) Topical daily  cholecalciferol 5000 Unit(s) Oral daily  enoxaparin Injectable 40 milliGRAM(s) SubCutaneous every 24 hours  gabapentin 100 milliGRAM(s) Oral <User Schedule>  gabapentin 200 milliGRAM(s) Oral <User Schedule>  melatonin 5 milliGRAM(s) Oral at bedtime  metoprolol tartrate 50 milliGRAM(s) Oral two times a day  multivitamin 1 Tablet(s) Oral daily  oxyCODONE    IR 10 milliGRAM(s) Oral every 6 hours PRN  oxyCODONE    IR 5 milliGRAM(s) Oral every 6 hours PRN  polyethylene glycol 3350 17 Gram(s) Oral two times a day  potassium chloride    Tablet ER 40 milliEquivalent(s) Oral once  senna 2 Tablet(s) Oral at bedtime      PHYSICAL EXAM:   Vital Signs Last 24 Hrs  T(C): 37 (24 Mar 2023 04:21), Max: 37.1 (23 Mar 2023 12:15)  T(F): 98.6 (24 Mar 2023 04:21), Max: 98.7 (23 Mar 2023 12:15)  HR: 64 (24 Mar 2023 07:00) (62 - 88)  BP: 122/71 (24 Mar 2023 07:00) (100/73 - 144/73)  BP(mean): 85 (24 Mar 2023 07:00) (77 - 110)  RR: 15 (24 Mar 2023 07:00) (12 - 21)  SpO2: 94% (24 Mar 2023 07:00) (91% - 100%)    Parameters below as of 24 Mar 2023 06:00  Patient On (Oxygen Delivery Method): room air      General: No acute distress      NEUROLOGICAL EXAM:    Mental status: Awake, alert, oriented x3, speech fluent, follows commands, no neglect, normal memory , follows commands, IDs objects, repetition intact   Cranial Nerves: No facial asymmetry, no nystagmus, no dysarthria,  tongue midline  Motor exam: Normal tone, subtle drift left upper and left lower extremities,  5/5 RUE, 5/5 RLE, 4+/5 LUE, 4+/5 LLE, normal fine finger movements.  Sensation: Intact to light touch   Coordination/ Gait: No dysmetria, gait not tested          LABS:                        11.5   3.98  )-----------( 154      ( 24 Mar 2023 03:21 )             35.2    03-24    140  |  103  |  13.8  ----------------------------<  110<H>  3.9   |  26.0  |  0.86    Ca    10.1      24 Mar 2023 03:21  Phos  3.3     03-24  Mg     1.8     03-24    TPro  6.6  /  Alb  3.9  /  TBili  0.4  /  DBili  x   /  AST  43<H>  /  ALT  35<H>  /  AlkPhos  64  03-22  PT/INR - ( 22 Mar 2023 18:38 )   PT: 12.1 sec;   INR: 1.04 ratio         PTT - ( 22 Mar 2023 18:38 )  PTT:26.5 sec      IMAGING: Reviewed by me.     TTE Echo Complete w/ Contrast w/ Doppler (03.23.23 @ 14:06)   Summary:   1. Left ventricular ejection fraction, by visual estimation, is 60 to   65%.   2. Normal global left ventricular systolic function.   3. Normal left ventricular internal cavity size.   4. Spectral Doppler shows impaired relaxation pattern of left   ventricular myocardial filling (Grade I diastolic dysfunction).   5. Normal left atrial size.   6. Normal right atrial size.   7. There is no evidence of pericardial effusion.   8. Trace mitral valve regurgitation.   9. Mild aortic regurgitation.  10. Sclerotic aortic valve with normal opening.    CT Head No Cont (03.23.23 @ 18:43)   No evidence of an acute intracranial hemorrhage    US Duplex Carotid Arteries Complete, Bilateral (03.23.23 @ 16:34)   IMPRESSION: Calcified plaque at the origin of the right internalcarotid   artery. Although measured blood flow velocities are borderline, the   degree of luminal narrowing likely exceeds 50% by diameter.    Calcified plaque in the left internal carotid artery without duplex   evidence of hemodynamically significant stenosis.    Measurement of carotid stenosis is based on velocity parameters that   correlate the residual internal carotid diameter with that of the more   distal vessel in accordance with a method such as the [facility]    US Duplex Venous Lower Ext Complete, Bilateral (03.23.23 @ 16:33)   IMPRESSION:  No evidence of deep venous thrombosis in either lower extremity.    CT Angio Neck w/ IV Cont (03.22.23 @ 19:01)   CT perfusion:  CBF<30% volume: 0 ml  Tmax>6.0s volume: 0 ml  Mismatch volume: 0 ml  Mismatch ratio: None    CT angiography neck:  Retropharyngeal course of bilateral cervical ICAs.  Right carotid system: Severe stenosis of the proximal right ICA using   NASCET criteria.  Left carotid system: No hemodynamically significant stenosis using NASCET   criteria.    CT angiography brain:  1.  No large vessel occlusion.  2.  Two 1 mm conical inferiorly directed outpouchingsarising from the   right supraclinoid ICA favoring infundibula, less likely aneurysms (9:59).    MR Head No Cont (03.21.23 @ 08:25)   IMPRESSION: No acute infarction.

## 2023-03-24 NOTE — SPEECH LANGUAGE PATHOLOGY EVALUATION - SLP DIAGNOSIS
Receptive & expressive language skills grossly WFL, speech intelligibility WFL. Pt reported concerns with word finding & recall however, stress related

## 2023-03-24 NOTE — PROGRESS NOTE ADULT - TIME BILLING
review of relevant history, clinical examination, review of data and images, discussion of treatment with the multidisciplinary team and any consultants involved in this patient’s care.

## 2023-03-24 NOTE — PROGRESS NOTE ADULT - SUBJECTIVE AND OBJECTIVE BOX
HPI:  73 year old female with PMH TIA x 4 (most recent 3/21/23), HTN, RA presents with L sided weakness. Pt reports that 1 hour PTA she developed LUE weakness. She states that initially she had difficulty with getting her words out but that has improved back to baseline. No blurry vision, numbness, chets pain, fevers.  CT Head Negative. CTP Negative. NIH 2 on admission, TNK given @ 1904.   (22 Mar 2023 21:17)    O/n events: none reported.    ICU Vital Signs Last 24 Hrs  T(C): 37.1 (24 Mar 2023 13:13), Max: 37.1 (23 Mar 2023 23:23)  T(F): 98.8 (24 Mar 2023 13:13), Max: 98.8 (24 Mar 2023 13:13)  HR: 80 (24 Mar 2023 13:13) (63 - 88)  BP: 112/67 (24 Mar 2023 13:13) (103/64 - 144/73)  BP(mean): 80 (24 Mar 2023 13:00) (77 - 110)  ABP: --  ABP(mean): --  RR: 18 (24 Mar 2023 13:13) (12 - 21)  SpO2: 100% (24 Mar 2023 13:13) (92% - 100%)    O2 Parameters below as of 24 Mar 2023 13:13  Patient On (Oxygen Delivery Method): room air      Labs, imaging reviewed.    Exam:  AAOx3, EO spont, face symmetric, visual fields intact, EOMI, PERRLA, speech clear, comprehension seems intact, LUE 4/5 with no drift, LLE 4/5 with drift, RUE/RLE 5/5 with no drift, sensation intact to LT.  CTAB  S1S2 present  Abd soft, NT, ND  No peripheral swelling.

## 2023-03-24 NOTE — OCCUPATIONAL THERAPY INITIAL EVALUATION ADULT - ADDITIONAL COMMENTS
Pt reports a very small shower with curtains. Pt was independent PTA without an assist device. Pt owns a RW and hip kit. Pt is right handed and drives.

## 2023-03-24 NOTE — OCCUPATIONAL THERAPY INITIAL EVALUATION ADULT - GENERAL OBSERVATIONS, REHAB EVAL
Received pt OOB in chair, NAD, +IV lock, +Tele//BP on RA. Pre/post pain level 0/10. Pt agreeable to OT evaluation.

## 2023-03-24 NOTE — PROGRESS NOTE ADULT - ASSESSMENT
73 year old female with stroke-like Sx; LKWT approx. 1730 on 03/22/2023 (1 hr prior to arrival); NIH 2, mRS 0; received IV thrombolytic.  No LVO.  Intracranial atherosclerotic dz with severe proximal R ICA stenosis.  PMH TIA x 4 (most recent 3/21/23), HTN, RA.    Plan:  neurochecks  pending MRI - plan for w/ and w/o contrast studies in view of frequent stroke-like events  PT/OT  short-term DAPT: re-start home Plavix 75 daily, start ASA - 325 mg today followed by  81 daily starting tomorrow 3/25; plan to cont DAPT for 21 days followed by either Plavix or ASA alone  NSGy eval for severe ICA stenosis, possible CEA  cont statin (LDL at goal)  maintain SBP<180/105,cont home Metoprolol, hold the rest for now to avoid hypotension, aim for normotension starting tomorrow 3/25  maintain Osat >92%  diet as tolerated; BM regimen  UOP, e-lytes - monitor  SCDs  maintain -180, ISS  stable to be downgraded to Telemetry; Medicine and Neurology involvement  appreciated

## 2023-03-24 NOTE — SPEECH LANGUAGE PATHOLOGY EVALUATION - SLP CRITERIA FOR SKILLED THERAPEUTIC INTERVENTIONS MET
ST is RX upon discharge from this facility to formally assess cognition & discuss compensatory recall & word finding strategies

## 2023-03-24 NOTE — PROGRESS NOTE ADULT - SUBJECTIVE AND OBJECTIVE BOX
JOSE DE JESUS MENDEZ    [id number]    73y      Female    CC: L-sided weakness     INTERVAL HPI/OVERNIGHT EVENTS: 73 year old female with PMH TIA x 4 (most recent 3/21/23), HTN, RA presents with L sided weakness. Pt reports that 1 hour PTA she developed LUE weakness. She states that initially she had difficulty with getting her words out but that has improved back to baseline. No blurry vision, numbness, chets pain, fevers.  CT Head Negative. CTP Negative. NIH 2 on admission, TNK given @ 1904. (22-Mar-2023)     Pt admitted NSICU 3/22. Repeat CTH 3/23 stable. Pt also found to have severe ICA stenosis on prior imaging.     REVIEW OF SYSTEMS:    CONSTITUTIONAL: No fever, weight loss  RESPIRATORY: No cough, wheezing, hemoptysis; No shortness of breath  CARDIOVASCULAR: No chest pain, palpitations  GASTROINTESTINAL: No abdominal or epigastric pain. No nausea, vomiting  NEUROLOGICAL: No headaches    Vital Signs Last 24 Hrs  T(C): 36.8 (24 Mar 2023 07:50), Max: 37.1 (23 Mar 2023 12:15)  T(F): 98.2 (24 Mar 2023 07:50), Max: 98.7 (23 Mar 2023 12:15)  HR: 63 (24 Mar 2023 11:00) (63 - 88)  BP: 105/70 (24 Mar 2023 11:00) (103/64 - 144/73)  BP(mean): 81 (24 Mar 2023 11:00) (77 - 110)  RR: 13 (24 Mar 2023 11:00) (12 - 21)  SpO2: 99% (24 Mar 2023 11:00) (91% - 100%)    Parameters below as of 24 Mar 2023 08:00  Patient On (Oxygen Delivery Method): room air        PHYSICAL EXAM:    GENERAL: NAD  HEENT: PERRL, +EOMI  NECK: soft, supple  CHEST/LUNG: Clear to auscultation bilaterally; No wheezing  HEART: S1S2+, Regular rate and rhythm; No murmurs, rubs, or gallops  ABDOMEN: Soft, Nontender, Nondistended; Bowel sounds present  SKIN: No rashes or lesions  NEURO: AAOX3, no focal deficits, no motor or sensory loss  PSYCH: normal mood    LABS:                        11.5   3.98  )-----------( 154      ( 24 Mar 2023 03:21 )             35.2     03-24    140  |  103  |  13.8  ----------------------------<  110<H>  3.9   |  26.0  |  0.86    Ca    10.1      24 Mar 2023 03:21  Phos  3.3     03-24  Mg     1.8     03-24    TPro  6.6  /  Alb  3.9  /  TBili  0.4  /  DBili  x   /  AST  43<H>  /  ALT  35<H>  /  AlkPhos  64  03-22    PT/INR - ( 22 Mar 2023 18:38 )   PT: 12.1 sec;   INR: 1.04 ratio         PTT - ( 22 Mar 2023 18:38 )  PTT:26.5 sec        MEDICATIONS  (STANDING):  aspirin  chewable 81 milliGRAM(s) Oral daily  atorvastatin 20 milliGRAM(s) Oral at bedtime  chlorhexidine 2% Cloths 1 Application(s) Topical daily  cholecalciferol 5000 Unit(s) Oral daily  enoxaparin Injectable 40 milliGRAM(s) SubCutaneous every 24 hours  gabapentin 100 milliGRAM(s) Oral <User Schedule>  gabapentin 200 milliGRAM(s) Oral <User Schedule>  melatonin 5 milliGRAM(s) Oral at bedtime  metoprolol tartrate 50 milliGRAM(s) Oral two times a day  multivitamin 1 Tablet(s) Oral daily  polyethylene glycol 3350 17 Gram(s) Oral two times a day  senna 2 Tablet(s) Oral at bedtime    MEDICATIONS  (PRN):  acetaminophen     Tablet .. 650 milliGRAM(s) Oral every 6 hours PRN Mild Pain (1 - 3)  oxyCODONE    IR 10 milliGRAM(s) Oral every 6 hours PRN Severe Pain (7 - 10)  oxyCODONE    IR 5 milliGRAM(s) Oral every 6 hours PRN Moderate Pain (4 - 6)      RADIOLOGY & ADDITIONAL TESTS:   JOSE DE JESUS MENDEZ    [id number]    73y      Female    CC: L-sided weakness     INTERVAL HPI/OVERNIGHT EVENTS: 73 year old female with PMH TIA x 4 (most recent 3/21/23), HTN, RA presents with L sided weakness. Pt reports that 1 hour PTA she developed LUE weakness. She states that initially she had difficulty with getting her words out but that has improved back to baseline. No blurry vision, numbness, chets pain, fevers.  CT Head Negative. CTP Negative. NIH 2 on admission, TNK given @ 1904. (22-Mar-2023)     Pt admitted NSICU 3/22. Repeat CTH 3/23 stable. Pt also found to have severe ICA stenosis on prior imaging.     Pt seen and examined. concerned about ICA stenosis.       REVIEW OF SYSTEMS:    CONSTITUTIONAL: No fever, weight loss  RESPIRATORY: No cough, wheezing, hemoptysis; No shortness of breath  CARDIOVASCULAR: No chest pain, palpitations  GASTROINTESTINAL: No abdominal or epigastric pain. No nausea, vomiting    Vital Signs Last 24 Hrs  T(C): 36.8 (24 Mar 2023 07:50), Max: 37.1 (23 Mar 2023 12:15)  T(F): 98.2 (24 Mar 2023 07:50), Max: 98.7 (23 Mar 2023 12:15)  HR: 63 (24 Mar 2023 11:00) (63 - 88)  BP: 105/70 (24 Mar 2023 11:00) (103/64 - 144/73)  BP(mean): 81 (24 Mar 2023 11:00) (77 - 110)  RR: 13 (24 Mar 2023 11:00) (12 - 21)  SpO2: 99% (24 Mar 2023 11:00) (91% - 100%)    Parameters below as of 24 Mar 2023 08:00  Patient On (Oxygen Delivery Method): room air        PHYSICAL EXAM:    GENERAL: NAD  HEENT: PERRL, +EOMI  NECK: soft, supple  CHEST/LUNG: Clear to auscultation bilaterally; No wheezing  HEART: S1S2+, Regular rate and rhythm  ABDOMEN: Soft, Nontender, Nondistended; Bowel sounds present  SKIN: warm, dry  NEURO: AAOX3, strength 4/5 LUE/LLE, 5/5 to RUE/RLE; sensation grossly intact; speech clear   PSYCH: normal affect     LABS:                        11.5   3.98  )-----------( 154      ( 24 Mar 2023 03:21 )             35.2     03-24    140  |  103  |  13.8  ----------------------------<  110<H>  3.9   |  26.0  |  0.86    Ca    10.1      24 Mar 2023 03:21  Phos  3.3     03-24  Mg     1.8     03-24    TPro  6.6  /  Alb  3.9  /  TBili  0.4  /  DBili  x   /  AST  43<H>  /  ALT  35<H>  /  AlkPhos  64  03-22    PT/INR - ( 22 Mar 2023 18:38 )   PT: 12.1 sec;   INR: 1.04 ratio         PTT - ( 22 Mar 2023 18:38 )  PTT:26.5 sec        MEDICATIONS  (STANDING):  aspirin  chewable 81 milliGRAM(s) Oral daily  atorvastatin 20 milliGRAM(s) Oral at bedtime  chlorhexidine 2% Cloths 1 Application(s) Topical daily  cholecalciferol 5000 Unit(s) Oral daily  enoxaparin Injectable 40 milliGRAM(s) SubCutaneous every 24 hours  gabapentin 100 milliGRAM(s) Oral <User Schedule>  gabapentin 200 milliGRAM(s) Oral <User Schedule>  melatonin 5 milliGRAM(s) Oral at bedtime  metoprolol tartrate 50 milliGRAM(s) Oral two times a day  multivitamin 1 Tablet(s) Oral daily  polyethylene glycol 3350 17 Gram(s) Oral two times a day  senna 2 Tablet(s) Oral at bedtime    MEDICATIONS  (PRN):  acetaminophen     Tablet .. 650 milliGRAM(s) Oral every 6 hours PRN Mild Pain (1 - 3)  oxyCODONE    IR 10 milliGRAM(s) Oral every 6 hours PRN Severe Pain (7 - 10)  oxyCODONE    IR 5 milliGRAM(s) Oral every 6 hours PRN Moderate Pain (4 - 6)      RADIOLOGY & ADDITIONAL TESTS:  < from: CT Head No Cont (03.22.23 @ 20:55) >  IMPRESSION:    No significant interval change compared to the recent study.  No intracranial hemorrhage, mass effect or large acute cortical infarct.    < end of copied text >  < from: US Duplex Carotid Arteries Complete, Bilateral (03.23.23 @ 16:34) >  IMPRESSION: Calcified plaque at the origin of the right internalcarotid   artery. Although measured blood flow velocities are borderline, the   degree of luminal narrowing likely exceeds 50% by diameter.    Calcified plaque in the left internal carotid artery without duplex   evidence of hemodynamically significant stenosis.    < end of copied text >  < from: MR Head No Cont (03.21.23 @ 08:25) >  COMPARISON: Head CT dated 3/20/2023.    TECHNIQUE: MRI brain: Multiplanar, multisequence MR imaging of the brain   are obtained without the administration of intravenous Gadavist contrast.    FINDINGS:  There is no abnormal restricted diffusion to suggest acute infarction. No   abnormal signal is demonstrated throughout the brain parenchyma. Normal   T2 flow-voids are seen within  the intracranial vasculature. The lateral   ventricles and cortical sulci are age-appropriate in size and   configuration. There is no mass, mass effect, or extra-axial fluid   collection. There is no susceptibility artifact to suggest hemorrhage.   Midline structures are normal.    The patient is status post bilateral ocular lens replacement surgery. 1.3   cm left maxillary sinus mucous retention cyst. The visualized paranasal   sinuses, mastoid air cells and orbits are unremarkable.      IMPRESSION: No acute infarction.    < end of copied text >  < from: CT Angio Neck w/ IV Cont (03.22.23 @ 19:01) >    IMPRESSION:    CT perfusion:  CBF<30% volume: 0 ml  Tmax>6.0s volume: 0 ml  Mismatch volume: 0 ml  Mismatch ratio: None    CT angiography neck:  Retropharyngeal course of bilateral cervical ICAs.  Right carotid system: Severe stenosis of the proximal right ICA using   NASCET criteria.  Left carotid system: No hemodynamically significant stenosis using NASCET   criteria.    CT angiography brain:  1.  No large vessel occlusion.  2.  Two 1 mm conical inferiorly directed outpouchingsarising from the   right supraclinoid ICA favoring infundibula, less likely aneurysms (9:59).    < end of copied text >

## 2023-03-24 NOTE — OCCUPATIONAL THERAPY INITIAL EVALUATION ADULT - DIAGNOSIS, OT EVAL
73 year old Female PMH TIA x 4 (most recent 3/21/23), HTN, RA presented with Left sided weakness. Pt states that initially she had difficulty with getting her words out but that has improved back to baseline. No blurry vision, numbness, chest pain, fevers. pt was noted to have severe ICA stenosis upon her last visit for TIA 3/21/23. CTA head/ neck  without large vessel occlusion therefor not thrombectomy candidate. Pt was a candidate and received tenecteplase at 1904 on 3/22/23.

## 2023-03-24 NOTE — SPEECH LANGUAGE PATHOLOGY EVALUATION - SLP GENERAL OBSERVATIONS
Pt received & seen seated upright OOB In chair, awake/alert, pleasant & cooperative, 0/10 pain pre/post

## 2023-03-25 ENCOUNTER — TRANSCRIPTION ENCOUNTER (OUTPATIENT)
Age: 73
End: 2023-03-25

## 2023-03-25 VITALS
HEART RATE: 71 BPM | TEMPERATURE: 99 F | SYSTOLIC BLOOD PRESSURE: 153 MMHG | RESPIRATION RATE: 18 BRPM | OXYGEN SATURATION: 96 % | DIASTOLIC BLOOD PRESSURE: 75 MMHG

## 2023-03-25 LAB
ANION GAP SERPL CALC-SCNC: 9 MMOL/L — SIGNIFICANT CHANGE UP (ref 5–17)
BUN SERPL-MCNC: 11.8 MG/DL — SIGNIFICANT CHANGE UP (ref 8–20)
CALCIUM SERPL-MCNC: 9.6 MG/DL — SIGNIFICANT CHANGE UP (ref 8.4–10.5)
CHLORIDE SERPL-SCNC: 104 MMOL/L — SIGNIFICANT CHANGE UP (ref 96–108)
CO2 SERPL-SCNC: 25 MMOL/L — SIGNIFICANT CHANGE UP (ref 22–29)
CREAT SERPL-MCNC: 0.84 MG/DL — SIGNIFICANT CHANGE UP (ref 0.5–1.3)
EGFR: 73 ML/MIN/1.73M2 — SIGNIFICANT CHANGE UP
GLUCOSE SERPL-MCNC: 117 MG/DL — HIGH (ref 70–99)
HCT VFR BLD CALC: 33.2 % — LOW (ref 34.5–45)
HGB BLD-MCNC: 10.6 G/DL — LOW (ref 11.5–15.5)
MAGNESIUM SERPL-MCNC: 1.9 MG/DL — SIGNIFICANT CHANGE UP (ref 1.6–2.6)
MCHC RBC-ENTMCNC: 29.4 PG — SIGNIFICANT CHANGE UP (ref 27–34)
MCHC RBC-ENTMCNC: 31.9 GM/DL — LOW (ref 32–36)
MCV RBC AUTO: 92.2 FL — SIGNIFICANT CHANGE UP (ref 80–100)
PHOSPHATE SERPL-MCNC: 3.4 MG/DL — SIGNIFICANT CHANGE UP (ref 2.4–4.7)
PLATELET # BLD AUTO: 148 K/UL — LOW (ref 150–400)
POTASSIUM SERPL-MCNC: 4 MMOL/L — SIGNIFICANT CHANGE UP (ref 3.5–5.3)
POTASSIUM SERPL-SCNC: 4 MMOL/L — SIGNIFICANT CHANGE UP (ref 3.5–5.3)
RBC # BLD: 3.6 M/UL — LOW (ref 3.8–5.2)
RBC # FLD: 13.2 % — SIGNIFICANT CHANGE UP (ref 10.3–14.5)
SODIUM SERPL-SCNC: 138 MMOL/L — SIGNIFICANT CHANGE UP (ref 135–145)
WBC # BLD: 3.84 K/UL — SIGNIFICANT CHANGE UP (ref 3.8–10.5)
WBC # FLD AUTO: 3.84 K/UL — SIGNIFICANT CHANGE UP (ref 3.8–10.5)

## 2023-03-25 PROCEDURE — 84443 ASSAY THYROID STIM HORMONE: CPT

## 2023-03-25 PROCEDURE — 85610 PROTHROMBIN TIME: CPT

## 2023-03-25 PROCEDURE — 37195 THROMBOLYTIC THERAPY STROKE: CPT

## 2023-03-25 PROCEDURE — 84132 ASSAY OF SERUM POTASSIUM: CPT

## 2023-03-25 PROCEDURE — 82435 ASSAY OF BLOOD CHLORIDE: CPT

## 2023-03-25 PROCEDURE — 93005 ELECTROCARDIOGRAM TRACING: CPT

## 2023-03-25 PROCEDURE — 86703 HIV-1/HIV-2 1 RESULT ANTBDY: CPT

## 2023-03-25 PROCEDURE — G1004: CPT

## 2023-03-25 PROCEDURE — 83036 HEMOGLOBIN GLYCOSYLATED A1C: CPT

## 2023-03-25 PROCEDURE — 85014 HEMATOCRIT: CPT

## 2023-03-25 PROCEDURE — 70551 MRI BRAIN STEM W/O DYE: CPT | Mod: MG

## 2023-03-25 PROCEDURE — 80053 COMPREHEN METABOLIC PANEL: CPT

## 2023-03-25 PROCEDURE — 82550 ASSAY OF CK (CPK): CPT

## 2023-03-25 PROCEDURE — G0378: CPT

## 2023-03-25 PROCEDURE — 70553 MRI BRAIN STEM W/O & W/DYE: CPT

## 2023-03-25 PROCEDURE — 82947 ASSAY GLUCOSE BLOOD QUANT: CPT

## 2023-03-25 PROCEDURE — 93880 EXTRACRANIAL BILAT STUDY: CPT

## 2023-03-25 PROCEDURE — 86803 HEPATITIS C AB TEST: CPT

## 2023-03-25 PROCEDURE — 82962 GLUCOSE BLOOD TEST: CPT

## 2023-03-25 PROCEDURE — 93970 EXTREMITY STUDY: CPT

## 2023-03-25 PROCEDURE — 99285 EMERGENCY DEPT VISIT HI MDM: CPT | Mod: 25

## 2023-03-25 PROCEDURE — 80307 DRUG TEST PRSMV CHEM ANLYZR: CPT

## 2023-03-25 PROCEDURE — 83605 ASSAY OF LACTIC ACID: CPT

## 2023-03-25 PROCEDURE — 70496 CT ANGIOGRAPHY HEAD: CPT | Mod: MA

## 2023-03-25 PROCEDURE — 82330 ASSAY OF CALCIUM: CPT

## 2023-03-25 PROCEDURE — C8929: CPT

## 2023-03-25 PROCEDURE — 99239 HOSP IP/OBS DSCHRG MGMT >30: CPT

## 2023-03-25 PROCEDURE — 85018 HEMOGLOBIN: CPT

## 2023-03-25 PROCEDURE — 70450 CT HEAD/BRAIN W/O DYE: CPT | Mod: MA

## 2023-03-25 PROCEDURE — 82803 BLOOD GASES ANY COMBINATION: CPT

## 2023-03-25 PROCEDURE — 80061 LIPID PANEL: CPT

## 2023-03-25 PROCEDURE — 84295 ASSAY OF SERUM SODIUM: CPT

## 2023-03-25 PROCEDURE — 85730 THROMBOPLASTIN TIME PARTIAL: CPT

## 2023-03-25 PROCEDURE — 0225U NFCT DS DNA&RNA 21 SARSCOV2: CPT

## 2023-03-25 PROCEDURE — 99291 CRITICAL CARE FIRST HOUR: CPT | Mod: 25

## 2023-03-25 PROCEDURE — 36415 COLL VENOUS BLD VENIPUNCTURE: CPT

## 2023-03-25 PROCEDURE — 0042T: CPT

## 2023-03-25 PROCEDURE — 99232 SBSQ HOSP IP/OBS MODERATE 35: CPT

## 2023-03-25 PROCEDURE — 85652 RBC SED RATE AUTOMATED: CPT

## 2023-03-25 PROCEDURE — 87640 STAPH A DNA AMP PROBE: CPT

## 2023-03-25 PROCEDURE — 85027 COMPLETE CBC AUTOMATED: CPT

## 2023-03-25 PROCEDURE — 84100 ASSAY OF PHOSPHORUS: CPT

## 2023-03-25 PROCEDURE — 85025 COMPLETE CBC W/AUTO DIFF WBC: CPT

## 2023-03-25 PROCEDURE — 81001 URINALYSIS AUTO W/SCOPE: CPT

## 2023-03-25 PROCEDURE — 87637 SARSCOV2&INF A&B&RSV AMP PRB: CPT

## 2023-03-25 PROCEDURE — 80048 BASIC METABOLIC PNL TOTAL CA: CPT

## 2023-03-25 PROCEDURE — 87641 MR-STAPH DNA AMP PROBE: CPT

## 2023-03-25 PROCEDURE — 83735 ASSAY OF MAGNESIUM: CPT

## 2023-03-25 PROCEDURE — 84484 ASSAY OF TROPONIN QUANT: CPT

## 2023-03-25 PROCEDURE — 71045 X-RAY EXAM CHEST 1 VIEW: CPT

## 2023-03-25 PROCEDURE — 70498 CT ANGIOGRAPHY NECK: CPT | Mod: MA

## 2023-03-25 RX ORDER — SENNA PLUS 8.6 MG/1
2 TABLET ORAL
Qty: 0 | Refills: 0 | DISCHARGE
Start: 2023-03-25

## 2023-03-25 RX ORDER — NYSTATIN CREAM 100000 [USP'U]/G
1 CREAM TOPICAL
Refills: 0 | Status: DISCONTINUED | OUTPATIENT
Start: 2023-03-25 | End: 2023-03-25

## 2023-03-25 RX ORDER — ASPIRIN/CALCIUM CARB/MAGNESIUM 324 MG
1 TABLET ORAL
Qty: 30 | Refills: 0
Start: 2023-03-25 | End: 2023-04-23

## 2023-03-25 RX ADMIN — CLOPIDOGREL BISULFATE 75 MILLIGRAM(S): 75 TABLET, FILM COATED ORAL at 16:02

## 2023-03-25 RX ADMIN — Medication 81 MILLIGRAM(S): at 16:02

## 2023-03-25 RX ADMIN — Medication 1 TABLET(S): at 16:03

## 2023-03-25 RX ADMIN — OXYCODONE HYDROCHLORIDE 10 MILLIGRAM(S): 5 TABLET ORAL at 14:16

## 2023-03-25 RX ADMIN — Medication 50 MILLIGRAM(S): at 10:09

## 2023-03-25 RX ADMIN — OXYCODONE HYDROCHLORIDE 10 MILLIGRAM(S): 5 TABLET ORAL at 13:30

## 2023-03-25 RX ADMIN — ENOXAPARIN SODIUM 40 MILLIGRAM(S): 100 INJECTION SUBCUTANEOUS at 12:02

## 2023-03-25 RX ADMIN — Medication 5000 UNIT(S): at 16:02

## 2023-03-25 RX ADMIN — OXYCODONE HYDROCHLORIDE 10 MILLIGRAM(S): 5 TABLET ORAL at 06:56

## 2023-03-25 RX ADMIN — GABAPENTIN 100 MILLIGRAM(S): 400 CAPSULE ORAL at 16:02

## 2023-03-25 NOTE — PROGRESS NOTE ADULT - SUBJECTIVE AND OBJECTIVE BOX
[facility] Neurology Stroke Team    CC: left side weakness, aphasia  HPI: 73 year old female with PMH TIA x 4 (most recent 3/21/23), HTN, RA presented with L sided weakness. Pt reports that 1 hour PTA she developed LUE weakness. She states that initially she had difficulty with getting her words out but that has improved back to baseline. No blurry vision, numbness, chest pain, fevers.   Of note, pt was noted to have severe ICA stenosis upon her last visit for TIA 3/21/23.          Vital Signs Last 24 Hrs  T(C): 37 (25 Mar 2023 16:14), Max: 37 (25 Mar 2023 16:14)  T(F): 98.6 (25 Mar 2023 16:14), Max: 98.6 (25 Mar 2023 16:14)  HR: 71 (25 Mar 2023 16:14) (71 - 84)  BP: 153/75 (25 Mar 2023 16:14) (128/65 - 153/75)  BP(mean): 153 (25 Mar 2023 16:14) (153 - 153)  RR: 18 (25 Mar 2023 16:14) (18 - 18)  SpO2: 96% (25 Mar 2023 16:14) (94% - 96%)    Parameters below as of 25 Mar 2023 16:14  Patient On (Oxygen Delivery Method): room air        MEDICATIONS    acetaminophen     Tablet .. 650 milliGRAM(s) Oral every 6 hours PRN  aspirin  chewable 81 milliGRAM(s) Oral daily  atorvastatin 20 milliGRAM(s) Oral at bedtime  cholecalciferol 5000 Unit(s) Oral daily  clopidogrel Tablet 75 milliGRAM(s) Oral daily  enoxaparin Injectable 40 milliGRAM(s) SubCutaneous every 24 hours  gabapentin 100 milliGRAM(s) Oral <User Schedule>  gabapentin 200 milliGRAM(s) Oral <User Schedule>  melatonin 5 milliGRAM(s) Oral at bedtime  metoprolol tartrate 50 milliGRAM(s) Oral two times a day  multivitamin 1 Tablet(s) Oral daily  nystatin Cream 1 Application(s) Topical two times a day  oxyCODONE    IR 10 milliGRAM(s) Oral every 6 hours PRN  oxyCODONE    IR 5 milliGRAM(s) Oral every 6 hours PRN  polyethylene glycol 3350 17 Gram(s) Oral two times a day  senna 2 Tablet(s) Oral at bedtime         LABS:  CBC Full  -  ( 25 Mar 2023 06:41 )  WBC Count : 3.84 K/uL  RBC Count : 3.60 M/uL  Hemoglobin : 10.6 g/dL  Hematocrit : 33.2 %  Platelet Count - Automated : 148 K/uL  Mean Cell Volume : 92.2 fl  Mean Cell Hemoglobin : 29.4 pg  Mean Cell Hemoglobin Concentration : 31.9 gm/dL  Auto Neutrophil # : x  Auto Lymphocyte # : x  Auto Monocyte # : x  Auto Eosinophil # : x  Auto Basophil # : x  Auto Neutrophil % : x  Auto Lymphocyte % : x  Auto Monocyte % : x  Auto Eosinophil % : x  Auto Basophil % : x      03-25    138  |  104  |  11.8  ----------------------------<  117<H>  4.0   |  25.0  |  0.84    Ca    9.6      25 Mar 2023 06:41  Phos  3.4     03-25  Mg     1.9     03-25        NEUROLOGICAL EXAM:    Mental status: Awake, alert, oriented x3, speech fluent, follows commands, no neglect, normal memory , follows commands, IDs objects, repetition intact   Cranial Nerves: No facial asymmetry, no nystagmus, no dysarthria,  tongue midline  Motor exam: Normal tone, subtle drift left upper and left lower extremities,  5/5 RUE, 5/5 RLE, 4+/5 LUE, 4+/5 LLE, normal fine finger movements.  Sensation: Intact to light touch   Coordination/ Gait: No dysmetria, gait not tested          LABS:                        11.5   3.98  )-----------( 154      ( 24 Mar 2023 03:21 )             35.2    03-24    140  |  103  |  13.8  ----------------------------<  110<H>  3.9   |  26.0  |  0.86    Ca    10.1      24 Mar 2023 03:21  Phos  3.3     03-24  Mg     1.8     03-24    TPro  6.6  /  Alb  3.9  /  TBili  0.4  /  DBili  x   /  AST  43<H>  /  ALT  35<H>  /  AlkPhos  64  03-22  PT/INR - ( 22 Mar 2023 18:38 )   PT: 12.1 sec;   INR: 1.04 ratio         PTT - ( 22 Mar 2023 18:38 )  PTT:26.5 sec      IMAGING: Reviewed by me.     TTE Echo Complete w/ Contrast w/ Doppler (03.23.23 @ 14:06)   Summary:   1. Left ventricular ejection fraction, by visual estimation, is 60 to   65%.   2. Normal global left ventricular systolic function.   3. Normal left ventricular internal cavity size.   4. Spectral Doppler shows impaired relaxation pattern of left   ventricular myocardial filling (Grade I diastolic dysfunction).   5. Normal left atrial size.   6. Normal right atrial size.   7. There is no evidence of pericardial effusion.   8. Trace mitral valve regurgitation.   9. Mild aortic regurgitation.  10. Sclerotic aortic valve with normal opening.    CT Head No Cont (03.23.23 @ 18:43)   No evidence of an acute intracranial hemorrhage    US Duplex Carotid Arteries Complete, Bilateral (03.23.23 @ 16:34)   IMPRESSION: Calcified plaque at the origin of the right internalcarotid   artery. Although measured blood flow velocities are borderline, the   degree of luminal narrowing likely exceeds 50% by diameter.    Calcified plaque in the left internal carotid artery without duplex   evidence of hemodynamically significant stenosis.    Measurement of carotid stenosis is based on velocity parameters that   correlate the residual internal carotid diameter with that of the more   distal vessel in accordance with a method such as the [facility]    US Duplex Venous Lower Ext Complete, Bilateral (03.23.23 @ 16:33)   IMPRESSION:  No evidence of deep venous thrombosis in either lower extremity.    CT Angio Neck w/ IV Cont (03.22.23 @ 19:01)   CT perfusion:  CBF<30% volume: 0 ml  Tmax>6.0s volume: 0 ml  Mismatch volume: 0 ml  Mismatch ratio: None    CT angiography neck:  Retropharyngeal course of bilateral cervical ICAs.  Right carotid system: Severe stenosis of the proximal right ICA using   NASCET criteria.  Left carotid system: No hemodynamically significant stenosis using NASCET   criteria.    CT angiography brain:  1.  No large vessel occlusion.  2.  Two 1 mm conical inferiorly directed outpouchingsarising from the   right supraclinoid ICA favoring infundibula, less likely aneurysms (9:59).    MR Head No Cont (03.21.23 @ 08:25)   IMPRESSION: No acute infarction.     MR Head w/wo IV Cont (03.24.23 @ 19:25) >    IMPRESSION: No acute infarction. No abnormal enhancement.    MAXIMO OSBORN MD; Attending Radiologist  This document has been electronically signed. Mar 24 2023  9:08PM

## 2023-03-25 NOTE — DISCHARGE NOTE PROVIDER - HOSPITAL COURSE
73y/oF PMH TIA x 4 (most recent 3/21/23), HTN, RA presents with L sided weakness. Pt reports that 1 hour PTA she developed LUE weakness. She states that initially she had difficulty with getting her words out but that has improved back to baseline. No blurry vision, numbness, chest pain, fevers.  CT Head Negative. CTP Negative. NIH 2 on admission, TNK given @ 1904. Admitted to NSICU 3/22. 24hr repeat CTH stable. Pt also found to have severe ICA stenosis on prior imaging. Pt downgraded to tele 3/24. CT 3/22: no intracranial hemorrhage, mass effect or large acute cortical infarct. CT perfusion/CTA head and neck: severe stenosis of proximal R ICA, no LVO, 2 1mm outpouchings from R supraclinoid ICA favoring infundibula, less likely aneurysms. US carotid: calcified plaque at both carotid bifurcations and ICA, R ICA likely exceeds 50%. Repeat CTH 24hr post-TNK stable. Patient seen by NS in regards to R ICA Stenosis - Plan will be for possible intervention outpatient. Patient was advised to follow up with NS on Wednesday. Medically optimized for discharge home.

## 2023-03-25 NOTE — DOWNTIME INTERRUPTION NOTE - WHICH MANUAL FORMS INITIATED?
See all paper records for clinical information entered during [place] downtime. Medication administration and I&O’s entered.

## 2023-03-25 NOTE — DISCHARGE NOTE PROVIDER - NSDCCPCAREPLAN_GEN_ALL_CORE_FT
PRINCIPAL DISCHARGE DIAGNOSIS  Diagnosis: CVA (cerebrovascular accident)  Assessment and Plan of Treatment: You had a TIA. Please continue to take medications as prescribed. Please follow up with your Neurosurgery for CEA evaluation. NS team has scheduled an appointment for Wednesday.

## 2023-03-25 NOTE — PROGRESS NOTE ADULT - NUTRITIONAL ASSESSMENT
This patient has been assessed with a concern for Malnutrition and has been determined to have a diagnosis/diagnoses of Morbid obesity (BMI > 40).    This patient is being managed with:   Diet DASH/TLC-  Sodium & Cholesterol Restricted  Entered: Mar 23 2023  8:16AM    

## 2023-03-25 NOTE — PROGRESS NOTE ADULT - ASSESSMENT
ASSESSMENT:  73 year old female with PMH TIA x 4 (most recent 3/21/23), HTN, RA presented with Left sided weakness and aphasia. NIH 2. Was a candidate and received tenecteplase at 1904 on 3/22/23.  CTA head/ neck  without large vessel occlusion therefor not thrombectomy candidate. Additionally noted,  Two 1 mm conical inferiorly directed out pouchings arising from the  right supraclinoid ICA favoring infundibula, less likely aneurysms. Right carotid severe stenosis. Rule out cerebral infarction, possibly symptomatic large artery atherosclerotic disease.     NEURO: Continue close monitoring for neurologic deterioration, with stroke check q 2 hour. Permissive HTN,  currently neurologically stable at  110-130 mmHg. Home statin regimen: LDL at goal of < 70,    MRI Brain of 3-24-23-- No acute infarct. -images and reports were reviewed.   Carotid doppler as noted.   Physical therapy/OT/Speech eval/treatment.   Neurosurgery evaluation regarding Carotid stenosis appreciated. Plan is outpatient follow up with Dr. Gomez for CEA    ANTITHROMBOTIC THERAPY: On ASA 81 mg. Add Plavix 75 QD.    PULMONARY:  protecting airway, saturating well     CARDIOVASCULAR: TTE as noted, cardiac monitoring, screen for Afib                         GASTROINTESTINAL:  dysphagia screen passed, advance as tolerated      Diet: DASH diet    RENAL: BUN/Cr 13.8/0.86, monitor urine output , maintain adequate hydration      Na Goal:  135-145     Harrison: N    HEMATOLOGY: H/H 11.5/35.2, Platelets 154, patient should have all age and risk appropriate malignancy screening     DVT ppx: Heparin s.c [] LMWH [x]    ID: afebrile, no leukocytosis, monitor for infection    ENDOCR: A1C 6.1,  diabetic/nutrition educator- diet/lifestyle modifications , continue risk factor control of DM, HTN, HLD. follow up with neurology outpatient    DISPOSITION: Rehab or home depending on PT eval once stable and workup is complete      CORE MEASURES:        Admission NIHSS: 2     Tenecteplase : [x] YES [] NO      LDL/HDL/A1C: 51/29/6.1     Depression Screen: P     Statin Therapy: Atorvastatin 20 mg daily      Dysphagia Screen: [x] PASS [] FAIL     Smoking [] YES [x] NO      Afib [] YES [x] NO     Stroke Education [x] YES [] NO

## 2023-03-25 NOTE — DISCHARGE NOTE PROVIDER - CARE PROVIDER_API CALL
Micky Gomez; PhD)  Neurosurgery  [address] [place] [address]  Phone: [phone]  Fax: [phone]  Follow Up Time: 1-3 days

## 2023-03-25 NOTE — PROGRESS NOTE ADULT - ASSESSMENT
73yF PMH TIA x 4 most recently 3/21/23, presented with left sided hemiparesis about 1 hour prior to arrival, s/p tenecteplase 19:04 3/22/23.  CTA head and neck obtained found with severe TED stenosis, along with right supraclinoid ICA infundibula vs aneurysms.     1. Pt should continue on DAPT, plavix & ASA  2. No acute neurosurgical intervention at this time  3. Spoke with pt & she will follow up as outpatient in the office on Wednesday 3/29 with Patricia. The office will call her on Monday to arrange a time.

## 2023-03-25 NOTE — DISCHARGE NOTE PROVIDER - ATTENDING DISCHARGE PHYSICAL EXAMINATION:
PHYSICAL EXAM:  Vital Signs Last 24 Hrs  T(F): 98.4 (24 Mar 2023 21:07), Max: 98.4 (24 Mar 2023 21:07)  HR: 84 (24 Mar 2023 21:07) (68 - 84)  BP: 128/65 (24 Mar 2023 21:07) (126/70 - 128/65)  RR: 18 (24 Mar 2023 21:07) (18 - 18)  SpO2: 94% (24 Mar 2023 21:07) (94% - 94%)    GENERAL: NAD, Resting in bed  Eyes: EOMI, PERRLA  ENMT: Conjunctiva and sclera clear; supple neck, No JVD  Cardiovascular: S1,S2, RRR, No murmur  Respiratory: CTA B/L, Non-labored breathing  GI: Bowel sounds present; Soft, Nontender, Nondistended. No hepatomegaly  Genitourinary: Deferred  Skin:  no breakdowns, ulcers or discharge, No rashes or lesions  Neurology: Alert & Oriented X3, non-focal and spontaneous movements of all extremities, CN 2 to 12 grossly intact   Psych: Appropriate mood and affect, calm, pleasant, Responds appropriately to questions

## 2023-03-25 NOTE — PROGRESS NOTE ADULT - SUBJECTIVE AND OBJECTIVE BOX
HPI:73 year old female with PMH TIA x 4 (most recent 3/21/23), HTN, RA presents with L sided weakness. Pt reports that 1 hour PTA she developed LUE weakness. She states that initially she had difficulty with getting her words out but that has improved back to baseline. No blurry vision, numbness, chets pain, fevers.  CT Head Negative. CTP Negative. NIH 2 on admission, TNK given @ 1904.      (22 Mar 2023 21:17)      INTERVAL OVERNIGHT EVENTS: 3/25  No acute overnight events    Vital Signs Last 24 Hrs  T(C): 36.9 (24 Mar 2023 21:07), Max: 36.9 (24 Mar 2023 21:07)  T(F): 98.4 (24 Mar 2023 21:07), Max: 98.4 (24 Mar 2023 21:07)  HR: 84 (24 Mar 2023 21:07) (68 - 84)  BP: 128/65 (24 Mar 2023 21:07) (126/70 - 128/65)  BP(mean): --  RR: 18 (24 Mar 2023 21:07) (18 - 18)  SpO2: 94% (24 Mar 2023 21:07) (94% - 94%)    Parameters below as of 24 Mar 2023 21:07  Patient On (Oxygen Delivery Method): room air        PHYSICAL EXAM:  GENERAL: NAD, well-groomed, well-developed  HEAD:  Atraumatic, normocephalic  Neuro exam: A & Ox3. EOMI, face symmetrical, speech clear & fluent. Follows commands.   CONRAD x4 with good effort, 4+ in right upper extremity otherwise 5/5 throughout  + left upper extremity mild drift   Sensation intact to light touch       LABS:                        10.6   3.84  )-----------( 148      ( 25 Mar 2023 06:41 )             33.2     03-25    138  |  104  |  11.8  ----------------------------<  117<H>  4.0   |  25.0  |  0.84    Ca    9.6      25 Mar 2023 06:41  Phos  3.4     03-25  Mg     1.9     03-25 03-24 @ 07:01  -  03-25 @ 07:00  --------------------------------------------------------  IN: 600 mL / OUT: 151 mL / NET: 449 mL        RADIOLOGY & ADDITIONAL TESTS:    MR Head w/wo IV Cont (03.24.23 @ 19:25)   No abnormal leptomeningealor parenchymal enhancement.  There is no abnormal restricted diffusion to suggest acute infarction.   Scattered periventricular and subcortical white matter T2 /FLAIR   hyperintensities are seen without mass effect, nonspecific, likely   representingmild chronic microvascular changes. Normal T2 flow-voids are   seen within  the intracranial vasculature. The lateral ventricles and   cortical sulci are age-appropriate in size and configuration. There is no   mass, mass effect, or extra-axial fluidcollection. There is no   susceptibility artifact to suggest hemorrhage. Midline structures are   normal.  The patient is status post bilateral ocular lens replacement   surgery. 1 cm left maxillary sinus mucous retention cyst. The remaining   visualized paranasal sinuses, mastoid air cells and orbits are otherwise   unremarkable.    CT Head No Cont (03.23.23 @ 18:43)   FINDINGS:  HEMORRHAGE:  No evidence of intracranial hemorrhage.  BRAIN PARENCHYMA:  Mild atrophy. Mild scattered white matter ischemic   changes.  No mass or mass effect.  VENTRICLES / SHIFT:  No hydrocephalus. No midline shift.  EXTRA-AXIAL / BASAL CISTERNS:  No extra-axial mass. Basal cisterns   preserved.  CALVARIUM AND EXTRACRANIAL SOFT TISSUES:  No depressed calvarial fracture.  SINUSES, ORBITS, MASTOIDS:  The visualized paranasalsinuses and mastoid   air cells are well aerated.    IMPRESSION:  No evidence of an acute intracranial hemorrhage       US Duplex Carotid Arteries Complete, Bilateral (03.23.23 @ 16:34)   There is calcified plaque at both carotid bifurcations and internal   carotid arteries. The right internal carotid artery is tortuous.   Disturbed color flow and elevated blood flow velocities are present in   the proximal right internal carotid artery.    Peak systolic velocities are as follows:    RIGHT:  PROX CCA = 75 cm/s  DIST CCA = 83 cm/s  PROX ICA = 166 cm/s  DIST ICA = 73cm/s  ECA = 80 cm/s    LEFT:  PROX CCA = 96 cm/s  DIST CCA = 75 cm/s  PROX ICA = 85 cm/s  DIST ICA = 81 cm/s  ECA = 82 cm/s    Antegrade flow is noted within both vertebral arteries.    IMPRESSION: Calcified plaque at the origin of the right internalcarotid   artery. Although measured blood flow velocities are borderline, the   degree of luminal narrowing likely exceeds 50% by diameter.    Calcified plaque in the left internal carotid artery without duplex   evidence of hemodynamically significant stenosis.    Measurement of carotid stenosis is based on velocity parameters that   correlate the residual internal carotid diameter with that of the more   distal vessel in accordance with a method such as the North American   Symptomatic Carotid Endarterectomy Trial (NASCET).      CT Angio Neck w/ IV Cont (03.22.23 @ 19:01)     IMPRESSION:    CT perfusion:  CBF<30% volume: 0 ml  Tmax>6.0s volume: 0 ml  Mismatch volume: 0 ml  Mismatch ratio: None    CT angiography neck:  Retropharyngeal course of bilateral cervical ICAs.  Right carotid system: Severe stenosis of the proximal right ICA using   NASCET criteria.  Left carotid system: No hemodynamically significant stenosis using NASCET   criteria.    CT angiography brain:  1.  No large vessel occlusion.  2.  Two 1 mm conical inferiorly directed outpouchingsarising from the   right supraclinoid ICA favoring infundibula, less likely aneurysms (9:59).                CAPRINI SCORE [CLOT]:  Patient has an estimated Caprini score of greater than 5.  However, the patient's unique clinical situation will be addressed in an individual manner to determine appropriate anticoagulation treatment, if any.

## 2023-03-29 ENCOUNTER — APPOINTMENT (OUTPATIENT)
Dept: NEUROSURGERY | Facility: CLINIC | Age: 73
End: 2023-03-29
Payer: MEDICARE

## 2023-03-29 VITALS
HEIGHT: 61 IN | SYSTOLIC BLOOD PRESSURE: 115 MMHG | HEART RATE: 71 BPM | WEIGHT: 206 LBS | OXYGEN SATURATION: 98 % | TEMPERATURE: 97.9 F | BODY MASS INDEX: 38.89 KG/M2 | DIASTOLIC BLOOD PRESSURE: 71 MMHG

## 2023-03-29 PROCEDURE — 99213 OFFICE O/P EST LOW 20 MIN: CPT

## 2023-03-30 NOTE — HISTORY OF PRESENT ILLNESS
[FreeTextEntry1] : JOSE DE JESUS MENDEZ is a 73 year old female who presents for follow up neurosurgical evaluation of right carotid stenosis.\par Hospital Course: \par Discharge Date	25-Mar-2023 \par Admission Date	22-Mar-2023 \par 73y/oF PMH TIA x 4 (most recent 3/21/23), HTN, RA presents with L sided weakness. Pt reports that 1 hour PTA she developed LUE weakness. She states that initially she had difficulty with getting her words out but that has improved back to baseline. No blurry vision, numbness, chest pain, fevers.  CT Head Negative.24hr repeat CTH stable. Pt also found to have severe ICA stenosis on prior imaging. CT 3/22: no intracranial hemorrhage, mass effect or large acute cortical infarct. CT perfusion/CTA head and neck: severe stenosis of proximal R ICA, no LVO, 2 1mm outpouchings from R supraclinoid ICA favoring infundibula, less likely aneurysms. US carotid: calcified plaque at both carotid bifurcations and ICA, R ICA likely exceeds 50%. Repeat CTH 24hr post-TNK stable.\par \par Currently she states she is getting slightly better. She still has some confusion and trouble with speech and getting her words out but does feel like its getting better. She is on Plavix and aspirin. She has had 4 TIAs within 5 years.

## 2023-03-30 NOTE — PHYSICAL EXAM
[General Appearance - Alert] : alert [General Appearance - In No Acute Distress] : in no acute distress [General Appearance - Well Nourished] : well nourished [Oriented To Time, Place, And Person] : oriented to person, place, and time [Impaired Insight] : insight and judgment were intact [Affect] : the affect was normal [Person] : oriented to person [Place] : oriented to place [Time] : oriented to time [Motor Strength] : muscle strength was normal in all four extremities [Sensation Tactile Decrease] : light touch was intact [Sensation Pain / Temperature Decrease] : pain and temperature was intact [Balance] : balance was intact [Sclera] : the sclera and conjunctiva were normal [PERRL With Normal Accommodation] : pupils were equal in size, round, reactive to light, with normal accommodation [Extraocular Movements] : extraocular movements were intact [Neck Appearance] : the appearance of the neck was normal [] : no respiratory distress [Abnormal Walk] : normal gait [Involuntary Movements] : no involuntary movements were seen [Motor Tone] : muscle strength and tone were normal [Coordination - Dysmetria Impaired Finger-to-Nose Bilateral] : not present

## 2023-03-30 NOTE — ASSESSMENT
[FreeTextEntry1] : Patient with above history and imaging. Severe stenosis in right ICA. 2 options discussed: stenting or carotid endarterectomy. Risk vs. benefits discussed. She would prefer to do the endarterectomy and does not want a stent. She will most likely need an angiogram before. \par \par Plan:\par Angiogram\par OR booking\par PCP clearance\par Patient knows to call the office if there are any new or worsening symptoms. \par All questions and concerns answered and addressed in detail to patient's complete satisfaction. Patient verbalized understanding and agreed to plan.\par \par

## 2023-03-30 NOTE — DATA REVIEWED
[de-identified] : FINDINGS:\par \par CT PERFUSION:\par \par CBF<30% volume: 0 ml\par Tmax>6.0s volume: 0 ml\par Mismatch volume: 0 ml\par Mismatch ratio: None\par \par CT ANGIOGRAPHY NECK:\par \par Thoracic aorta and branch vessels: Patent.\par Retropharyngeal course of bilateral cervical ICAs.\par Right carotid system: Severe stenosis of the proximal right ICA using NASCET criteria.\par Left carotid system: No hemodynamically significant stenosis using NASCET criteria.\par Vertebral arteries: No focal stenosis or dissection.\par \par CT ANGIOGRAPHY BRAIN:\par \par Internal carotid arteries: Patent. Two 1 mm conical inferiorly directed outpouchings arising from the right supraclinoid ICA favoring infundibula, less likely aneurysms (9:59).\par Anterior cerebral arteries: Patent.\par Middle cerebral arteries: Patent.\par Posterior cerebral arteries: Patent.\par Vertebrobasilar: Patent. Branch vasculature of the posterior circulation is within normal limits.\par \par Vascular lesions: No evidence of intracranial aneurysm or large vascular malformation, within limits of CT technique.\par \par \par IMPRESSION:\par \par CT perfusion:\par CBF<30% volume: 0 ml\par Tmax>6.0s volume: 0 ml\par Mismatch volume: 0 ml\par Mismatch ratio: None\par \par CT angiography neck:\par Retropharyngeal course of bilateral cervical ICAs.\par Right carotid system: Severe stenosis of the proximal right ICA using NASCET criteria.\par Left carotid system: No hemodynamically significant stenosis using NASCET criteria.\par \par CT angiography brain:\par 1. No large vessel occlusion.\par 2. Two 1 mm conical inferiorly directed outpouchings arising from the right supraclinoid ICA favoring infundibula, less likely aneurysms (9:59).\par

## 2023-04-04 ENCOUNTER — TRANSCRIPTION ENCOUNTER (OUTPATIENT)
Age: 73
End: 2023-04-04

## 2023-04-04 ENCOUNTER — APPOINTMENT (OUTPATIENT)
Dept: NEUROSURGERY | Facility: HOSPITAL | Age: 73
End: 2023-04-04

## 2023-04-04 ENCOUNTER — OUTPATIENT (OUTPATIENT)
Dept: OUTPATIENT SERVICES | Facility: HOSPITAL | Age: 73
LOS: 1 days | End: 2023-04-04
Payer: MEDICARE

## 2023-04-04 VITALS
SYSTOLIC BLOOD PRESSURE: 110 MMHG | RESPIRATION RATE: 17 BRPM | DIASTOLIC BLOOD PRESSURE: 55 MMHG | HEART RATE: 64 BPM | OXYGEN SATURATION: 98 %

## 2023-04-04 VITALS
SYSTOLIC BLOOD PRESSURE: 109 MMHG | DIASTOLIC BLOOD PRESSURE: 53 MMHG | HEART RATE: 64 BPM | HEIGHT: 61 IN | RESPIRATION RATE: 17 BRPM | WEIGHT: 205.03 LBS | OXYGEN SATURATION: 97 % | TEMPERATURE: 97 F

## 2023-04-04 DIAGNOSIS — R55 SYNCOPE AND COLLAPSE: ICD-10-CM

## 2023-04-04 DIAGNOSIS — I65.2 OCCLUSION AND STENOSIS OF CAROTID ARTERY: ICD-10-CM

## 2023-04-04 PROCEDURE — 36226 PLACE CATH VERTEBRAL ART: CPT | Mod: RT

## 2023-04-04 PROCEDURE — C1887: CPT

## 2023-04-04 PROCEDURE — C1894: CPT

## 2023-04-04 PROCEDURE — 36223 PLACE CATH CAROTID/INOM ART: CPT | Mod: 50

## 2023-04-04 PROCEDURE — 36223 PLACE CATH CAROTID/INOM ART: CPT

## 2023-04-04 PROCEDURE — C1769: CPT

## 2023-04-04 PROCEDURE — 36226 PLACE CATH VERTEBRAL ART: CPT

## 2023-04-04 RX ORDER — CHOLECALCIFEROL (VITAMIN D3) 125 MCG
1 CAPSULE ORAL
Qty: 0 | Refills: 0 | DISCHARGE

## 2023-04-04 RX ORDER — SODIUM CHLORIDE 9 MG/ML
1000 INJECTION INTRAMUSCULAR; INTRAVENOUS; SUBCUTANEOUS
Refills: 0 | Status: DISCONTINUED | OUTPATIENT
Start: 2023-04-04 | End: 2023-04-18

## 2023-04-04 RX ORDER — SODIUM CHLORIDE 9 MG/ML
5 INJECTION INTRAMUSCULAR; INTRAVENOUS; SUBCUTANEOUS ONCE
Refills: 0 | Status: COMPLETED | OUTPATIENT
Start: 2023-04-04 | End: 2023-04-04

## 2023-04-04 RX ORDER — ABATACEPT 125 MG/ML
0 INJECTION, SOLUTION SUBCUTANEOUS
Qty: 0 | Refills: 0 | DISCHARGE

## 2023-04-04 RX ORDER — CLOPIDOGREL BISULFATE 75 MG/1
1 TABLET, FILM COATED ORAL
Qty: 0 | Refills: 0 | DISCHARGE

## 2023-04-04 RX ORDER — OXYCODONE HYDROCHLORIDE 5 MG/1
1 TABLET ORAL
Qty: 0 | Refills: 0 | DISCHARGE

## 2023-04-04 RX ORDER — METOPROLOL TARTRATE 50 MG
1 TABLET ORAL
Qty: 0 | Refills: 0 | DISCHARGE

## 2023-04-04 RX ORDER — VALSARTAN 80 MG/1
1 TABLET ORAL
Qty: 0 | Refills: 0 | DISCHARGE

## 2023-04-04 RX ORDER — AMLODIPINE BESYLATE 2.5 MG/1
1 TABLET ORAL
Qty: 0 | Refills: 0 | DISCHARGE

## 2023-04-04 RX ORDER — DIPHENHYDRAMINE HCL 50 MG
1 CAPSULE ORAL
Qty: 0 | Refills: 0 | DISCHARGE

## 2023-04-04 RX ORDER — ROSUVASTATIN CALCIUM 5 MG/1
1 TABLET ORAL
Qty: 0 | Refills: 0 | DISCHARGE

## 2023-04-04 RX ADMIN — SODIUM CHLORIDE 100 MILLILITER(S): 9 INJECTION INTRAMUSCULAR; INTRAVENOUS; SUBCUTANEOUS at 12:00

## 2023-04-04 RX ADMIN — SODIUM CHLORIDE 5 MILLILITER(S): 9 INJECTION INTRAMUSCULAR; INTRAVENOUS; SUBCUTANEOUS at 13:00

## 2023-04-04 NOTE — ASU DISCHARGE PLAN (ADULT/PEDIATRIC) - CARE PROVIDER_API CALL
Micky Gomez; PhD)  Neurosurgery  [address] [place] [address]  Phone: [phone]  Fax: [phone]  Follow Up Time: 2 weeks

## 2023-04-04 NOTE — CHART NOTE - NSCHARTNOTEFT_GEN_A_CORE
Neurointerventional Surgery  Pre-Procedure Note     This is a 73y ____ hand dominant Female    HPI:  73 year old female with PMH TIA x 4 (most recent 3/21/23), HTN, RA presented to [facility] with L sided weakness. Pt reports that 1 hour PTA she developed LUE weakness. She states that initially she had difficulty with getting her words out but that has improved back to baseline. No blurry vision, numbness, chets pain, fevers.  CT Head Negative. CTP Negative. NIH 2 on admission, TNK given @ 1904. On admission patient was found to have severe stenosis of the TED. Patient presents today for cerebral angiogram to further evaluate stenosis and possible carotid stent vs CEA. Patient reports she feels well, has no symptoms including HA, weakness, numbness, tingling, CP, SOB, N/V.       Allergies: No Known Allergies      PAST MEDICAL & SURGICAL HISTORY:  Hypertension  Rheumatoid arthritis  History of TIAs      Current Medications:   sodium chloride 0.9% lock flush 5 milliLiter(s) IV Push once      Physical Exam:  Constitutional: NAD, lying in bed  Neuro  * Mental Status:  GCS 15: Awake, alert, oriented to conversation. No aphasia or difficulty speaking. No dysarthria. Able to name objects and their function.  * Cranial Nerves: Cnii-Cnxii grossly intact. PERRL, EOMI, tongue midline, no gaze deviation  * Motor: RUE 5/5, LUE 5/5, RLE 5/5, LLE 5/5, no drift or dysmetria  * Sensory: Sensation intact to light touch  * Reflexes: not assessed   Cardiovascular: Regular rate and rhythm.  Eyes: See neurologic examination with detailed examination of eyes.  ENT: No JVD, Trachea Midline  Respiratory: non labored breathing   Gastrointestinal: Soft, nontender, nondistended.  Genitourinary: [ ] Harrison, [ x ] No Harrison.   Musculoskeletal: No muscle wasting noted, (See neurologic assessment for full muscle strength assessment) No pretibial edema appreciated, no appreciable calf tenderness.  Skin:  no wounds, no redness, no abrasions noted  Hematologic / Lymph / Immunologic: No bleeding from IV sites or wounds, No lymphadenopathy, No Hives or allergic type skin lesions      NIH SS:  DATE: 4/4/23  TIME: 0945  1A: Level of consciousness (0-3): 0  1B: Questions (0-2): 0    1C: Commands (0-2): 0  2: Gaze (0-2): 0  3: Visual fields (0-3): 0  4: Facial palsy (0-3): 0  MOTOR:  5A: Left arm motor drift (0-4): 0  5B: Right arm motor drift (0-4): 0  6A: Left leg motor drift (0-4): 0  6B: Right leg motor drift (0-4): 0  7: Limb ataxia (0-2): 0  SENSORY:  8: Sensation (0-2): 0  SPEECH:  9: Language (0-3): 0  10: Dysarthria (0-2): 0  EXTINCTION:  11: Extinction/inattention (0-2): 0    TOTAL SCORE: 0      Labs:   3/25/23: WBC 3.8, hgb 10.6, hct 33.2, platelets 148  3/25/23: Na 138, K 4.0, , CO2 25, BUN 11.8, Cr 0.84, glucose 117      Assessment/Plan:   This is a 73y  year old right hand dominant Female  presents with R ICA stenosis. Patient presents to neuro-IR for selective cerebral angiography.     Procedure, goals, risks, benefits and alternatives  were discussed with patient.  All questions were answered.  Risks include but are not limited to stroke, vessel injury, hemorrhage, and or groin hematoma.  Patient demonstrates understanding  of all risks involved with this procedure and wishes to continue.   Appropriate  consent was obtained from patient and consent is in the patient's chart.

## 2023-04-04 NOTE — DISCHARGE NOTE NURSING/CASE MANAGEMENT/SOCIAL WORK - NSDCPEFALRISK_GEN_ALL_CORE
For information on Fall & Injury Prevention, visit: [email or web address] OR  [email or web address] OR  https://www.cdc.gov/steadi/patient.html

## 2023-04-04 NOTE — CHART NOTE - NSCHARTNOTEFT_GEN_A_CORE
Neurointerventional Surgery Post Procedure Note    Procedure: Selective Cerebral Angiography     Pre- Procedure Diagnosis: TED stenosis   Post- Procedure Diagnosis: TED stenosis     : Dr. Quintin OCONNOR  Physician Assistant: Batool Young  Nurse: Elva Thakkar  Anesthesiologist: Dr. Jack Fierro                   Radiology Tech: Alec Stevenson     Sheath:  5 Uzbek Sheath    I/Os: estimated blood loss less than 10cc,  IV fluids 300cc, Urine output 0cc, Contrast: Omnipaque 240  62 cc    Vitals:  BP    HR   Spo2  %    Preliminary Report:  Under a 5 Uzbek short sheath via the right wrist under MAC sedation via left common carotid artery, right vertebral artery, right common carotid artery, a selective cerebral angiography  was performed and reveals TED stenosis. ( Official note to follow).    Patient tolerated procedure well.  Patient remains hemodynamically stable, no change in neurological status compared to baseline.  Results were discussed with patient, patient's family and Neurosurgery.  Right wrist sheath  was discontinued using radial band with 12cc of air. Radial band applied at 1113.   No active bleeding, no hematoma, no ecchymosis.     Patient transferred to recovery room in stable condition.

## 2023-04-04 NOTE — ASU PATIENT PROFILE, ADULT - HEALTHCARE INFORMATION NEEDED, PROFILE
Detail Level: Detailed Quality 111:Pneumonia Vaccination Status For Older Adults: Pneumococcal Vaccination Previously Received med hsitory

## 2023-04-04 NOTE — ASU DISCHARGE PLAN (ADULT/PEDIATRIC) - NS MD DC FALL RISK RISK
For information on Fall & Injury Prevention, visit: [email or web address] OR  [email or web address] OR  https://www.cdc.gov/steadi/patient.html

## 2023-04-04 NOTE — DISCHARGE NOTE NURSING/CASE MANAGEMENT/SOCIAL WORK - PATIENT PORTAL LINK FT
You can access the FollowMyHealth Patient Portal offered by [facility] by registering at the following website: [email or web address]. By joining [facility]’s FollowMyHealth portal, you will also be able to view your health information using other applications (apps) compatible with our system.

## 2023-04-04 NOTE — ASU PATIENT PROFILE, ADULT - FALL HARM RISK - HARM RISK INTERVENTIONS

## 2023-04-04 NOTE — ASU DISCHARGE PLAN (ADULT/PEDIATRIC) - HAVE YOU HAD COVID IN THE LAST 60 DAYS?
What Type Of Note Output Would You Prefer (Optional)?: Bullet Format What Is The Reason For Today's Visit?: Full Body Skin Examination with No Concerns What Is The Reason For Today's Visit? (Being Monitored For X): surveillance against the recurrence of atypical nevi No

## 2023-04-23 ENCOUNTER — INPATIENT (INPATIENT)
Facility: HOSPITAL | Age: 73
LOS: 1 days | Discharge: HOME | DRG: 683 | End: 2023-04-25
Attending: STUDENT IN AN ORGANIZED HEALTH CARE EDUCATION/TRAINING PROGRAM | Admitting: STUDENT IN AN ORGANIZED HEALTH CARE EDUCATION/TRAINING PROGRAM
Payer: MEDICARE

## 2023-04-23 VITALS
DIASTOLIC BLOOD PRESSURE: 48 MMHG | HEIGHT: 61 IN | OXYGEN SATURATION: 99 % | RESPIRATION RATE: 20 BRPM | HEART RATE: 72 BPM | TEMPERATURE: 99 F | WEIGHT: 173.94 LBS | SYSTOLIC BLOOD PRESSURE: 94 MMHG

## 2023-04-23 DIAGNOSIS — N17.9 ACUTE KIDNEY FAILURE, UNSPECIFIED: ICD-10-CM

## 2023-04-23 LAB
ALBUMIN SERPL ELPH-MCNC: 4 G/DL — SIGNIFICANT CHANGE UP (ref 3.3–5.2)
ALP SERPL-CCNC: 63 U/L — SIGNIFICANT CHANGE UP (ref 40–120)
ALT FLD-CCNC: 35 U/L — HIGH
ANION GAP SERPL CALC-SCNC: 13 MMOL/L — SIGNIFICANT CHANGE UP (ref 5–17)
APPEARANCE UR: CLEAR — SIGNIFICANT CHANGE UP
AST SERPL-CCNC: 38 U/L — HIGH
BACTERIA # UR AUTO: ABNORMAL
BASOPHILS # BLD AUTO: 0.02 K/UL — SIGNIFICANT CHANGE UP (ref 0–0.2)
BASOPHILS NFR BLD AUTO: 0.3 % — SIGNIFICANT CHANGE UP (ref 0–2)
BILIRUB SERPL-MCNC: 0.4 MG/DL — SIGNIFICANT CHANGE UP (ref 0.4–2)
BILIRUB UR-MCNC: NEGATIVE — SIGNIFICANT CHANGE UP
BUN SERPL-MCNC: 45 MG/DL — HIGH (ref 8–20)
CALCIUM SERPL-MCNC: 10 MG/DL — SIGNIFICANT CHANGE UP (ref 8.4–10.5)
CHLORIDE SERPL-SCNC: 96 MMOL/L — SIGNIFICANT CHANGE UP (ref 96–108)
CO2 SERPL-SCNC: 27 MMOL/L — SIGNIFICANT CHANGE UP (ref 22–29)
COLOR SPEC: YELLOW — SIGNIFICANT CHANGE UP
CREAT SERPL-MCNC: 2.39 MG/DL — HIGH (ref 0.5–1.3)
DIFF PNL FLD: NEGATIVE — SIGNIFICANT CHANGE UP
EGFR: 21 ML/MIN/1.73M2 — LOW
EOSINOPHIL # BLD AUTO: 0.16 K/UL — SIGNIFICANT CHANGE UP (ref 0–0.5)
EOSINOPHIL NFR BLD AUTO: 2.7 % — SIGNIFICANT CHANGE UP (ref 0–6)
EPI CELLS # UR: SIGNIFICANT CHANGE UP
GLUCOSE SERPL-MCNC: 134 MG/DL — HIGH (ref 70–99)
GLUCOSE UR QL: NEGATIVE MG/DL — SIGNIFICANT CHANGE UP
HCT VFR BLD CALC: 33.1 % — LOW (ref 34.5–45)
HGB BLD-MCNC: 10.9 G/DL — LOW (ref 11.5–15.5)
IMM GRANULOCYTES NFR BLD AUTO: 0.2 % — SIGNIFICANT CHANGE UP (ref 0–0.9)
KETONES UR-MCNC: NEGATIVE — SIGNIFICANT CHANGE UP
LEUKOCYTE ESTERASE UR-ACNC: ABNORMAL
LYMPHOCYTES # BLD AUTO: 2.57 K/UL — SIGNIFICANT CHANGE UP (ref 1–3.3)
LYMPHOCYTES # BLD AUTO: 44.2 % — HIGH (ref 13–44)
MAGNESIUM SERPL-MCNC: 2.5 MG/DL — SIGNIFICANT CHANGE UP (ref 1.8–2.6)
MCHC RBC-ENTMCNC: 29.9 PG — SIGNIFICANT CHANGE UP (ref 27–34)
MCHC RBC-ENTMCNC: 32.9 GM/DL — SIGNIFICANT CHANGE UP (ref 32–36)
MCV RBC AUTO: 90.7 FL — SIGNIFICANT CHANGE UP (ref 80–100)
MONOCYTES # BLD AUTO: 0.67 K/UL — SIGNIFICANT CHANGE UP (ref 0–0.9)
MONOCYTES NFR BLD AUTO: 11.5 % — SIGNIFICANT CHANGE UP (ref 2–14)
NEUTROPHILS # BLD AUTO: 2.39 K/UL — SIGNIFICANT CHANGE UP (ref 1.8–7.4)
NEUTROPHILS NFR BLD AUTO: 41.1 % — LOW (ref 43–77)
NITRITE UR-MCNC: NEGATIVE — SIGNIFICANT CHANGE UP
NT-PROBNP SERPL-SCNC: 32 PG/ML — SIGNIFICANT CHANGE UP (ref 0–300)
PH UR: 6 — SIGNIFICANT CHANGE UP (ref 5–8)
PLATELET # BLD AUTO: 172 K/UL — SIGNIFICANT CHANGE UP (ref 150–400)
POTASSIUM SERPL-MCNC: 4.9 MMOL/L — SIGNIFICANT CHANGE UP (ref 3.5–5.3)
POTASSIUM SERPL-SCNC: 4.9 MMOL/L — SIGNIFICANT CHANGE UP (ref 3.5–5.3)
PROT SERPL-MCNC: 6.6 G/DL — SIGNIFICANT CHANGE UP (ref 6.6–8.7)
PROT UR-MCNC: NEGATIVE — SIGNIFICANT CHANGE UP
RBC # BLD: 3.65 M/UL — LOW (ref 3.8–5.2)
RBC # FLD: 13.5 % — SIGNIFICANT CHANGE UP (ref 10.3–14.5)
RBC CASTS # UR COMP ASSIST: SIGNIFICANT CHANGE UP /HPF (ref 0–4)
SODIUM SERPL-SCNC: 136 MMOL/L — SIGNIFICANT CHANGE UP (ref 135–145)
SP GR SPEC: 1.02 — SIGNIFICANT CHANGE UP (ref 1.01–1.02)
T4 AB SER-ACNC: 5.7 UG/DL — SIGNIFICANT CHANGE UP (ref 4.5–12)
TROPONIN T SERPL-MCNC: <0.01 NG/ML — SIGNIFICANT CHANGE UP (ref 0–0.06)
TSH SERPL-MCNC: 1.81 UIU/ML — SIGNIFICANT CHANGE UP (ref 0.27–4.2)
UROBILINOGEN FLD QL: NEGATIVE MG/DL — SIGNIFICANT CHANGE UP
WBC # BLD: 5.82 K/UL — SIGNIFICANT CHANGE UP (ref 3.8–10.5)
WBC # FLD AUTO: 5.82 K/UL — SIGNIFICANT CHANGE UP (ref 3.8–10.5)
WBC UR QL: ABNORMAL /HPF (ref 0–5)

## 2023-04-23 PROCEDURE — 99285 EMERGENCY DEPT VISIT HI MDM: CPT

## 2023-04-23 PROCEDURE — 99222 1ST HOSP IP/OBS MODERATE 55: CPT

## 2023-04-23 PROCEDURE — 70450 CT HEAD/BRAIN W/O DYE: CPT | Mod: 26,MA

## 2023-04-23 PROCEDURE — 99497 ADVNCD CARE PLAN 30 MIN: CPT | Mod: 25

## 2023-04-23 PROCEDURE — 93010 ELECTROCARDIOGRAM REPORT: CPT

## 2023-04-23 RX ORDER — ACETAMINOPHEN 500 MG
650 TABLET ORAL EVERY 6 HOURS
Refills: 0 | Status: DISCONTINUED | OUTPATIENT
Start: 2023-04-23 | End: 2023-04-25

## 2023-04-23 RX ORDER — CEFTRIAXONE 500 MG/1
1000 INJECTION, POWDER, FOR SOLUTION INTRAMUSCULAR; INTRAVENOUS ONCE
Refills: 0 | Status: COMPLETED | OUTPATIENT
Start: 2023-04-23 | End: 2023-04-23

## 2023-04-23 RX ORDER — AMLODIPINE BESYLATE 2.5 MG/1
10 TABLET ORAL ONCE
Refills: 0 | Status: COMPLETED | OUTPATIENT
Start: 2023-04-23 | End: 2023-04-23

## 2023-04-23 RX ORDER — ONDANSETRON 8 MG/1
4 TABLET, FILM COATED ORAL EVERY 8 HOURS
Refills: 0 | Status: DISCONTINUED | OUTPATIENT
Start: 2023-04-23 | End: 2023-04-25

## 2023-04-23 RX ORDER — GABAPENTIN 400 MG/1
600 CAPSULE ORAL ONCE
Refills: 0 | Status: COMPLETED | OUTPATIENT
Start: 2023-04-23 | End: 2023-04-23

## 2023-04-23 RX ORDER — OXYCODONE HYDROCHLORIDE 5 MG/1
10 TABLET ORAL ONCE
Refills: 0 | Status: DISCONTINUED | OUTPATIENT
Start: 2023-04-23 | End: 2023-04-23

## 2023-04-23 RX ORDER — CEFTRIAXONE 500 MG/1
1000 INJECTION, POWDER, FOR SOLUTION INTRAMUSCULAR; INTRAVENOUS ONCE
Refills: 0 | Status: DISCONTINUED | OUTPATIENT
Start: 2023-04-23 | End: 2023-04-23

## 2023-04-23 RX ORDER — LANOLIN ALCOHOL/MO/W.PET/CERES
3 CREAM (GRAM) TOPICAL AT BEDTIME
Refills: 0 | Status: DISCONTINUED | OUTPATIENT
Start: 2023-04-23 | End: 2023-04-25

## 2023-04-23 RX ADMIN — GABAPENTIN 600 MILLIGRAM(S): 400 CAPSULE ORAL at 23:31

## 2023-04-23 RX ADMIN — CEFTRIAXONE 1000 MILLIGRAM(S): 500 INJECTION, POWDER, FOR SOLUTION INTRAMUSCULAR; INTRAVENOUS at 22:46

## 2023-04-23 RX ADMIN — OXYCODONE HYDROCHLORIDE 10 MILLIGRAM(S): 5 TABLET ORAL at 22:45

## 2023-04-23 RX ADMIN — AMLODIPINE BESYLATE 10 MILLIGRAM(S): 2.5 TABLET ORAL at 22:45

## 2023-04-23 RX ADMIN — Medication 3 MILLIGRAM(S): at 23:31

## 2023-04-23 NOTE — H&P ADULT - NSHPPHYSICALEXAM_GEN_ALL_CORE
Vital Signs Last 24 Hrs  T(C): 37.1 (23 Apr 2023 19:12), Max: 37.1 (23 Apr 2023 19:12)  T(F): 98.8 (23 Apr 2023 19:12), Max: 98.8 (23 Apr 2023 19:12)  HR: 72 (23 Apr 2023 19:12) (72 - 72)  BP: 94/48 (23 Apr 2023 19:12) (94/48 - 94/48)  BP(mean): --  RR: 20 (23 Apr 2023 19:12) (20 - 20)  SpO2: 99% (23 Apr 2023 19:12) (99% - 99%)    Parameters below as of 23 Apr 2023 19:12  Patient On (Oxygen Delivery Method): room air

## 2023-04-23 NOTE — ED PROVIDER NOTE - OBJECTIVE STATEMENT
73 year old female with PMH TIAs x 5, RA, HTN presents with generalized weakness. Pt states she felt well at 930 pm yesterday when she went to bed, and she woke from sleep today with b/l generalized weakness. She states that her legs keep giving out on her when she is walking. She also reports a twitching sensation in her LUE. She states that earlier today she had an episode in which she felt lightheaded and her vision was blurry but that has since resolved. She denies focal weakness, slurred speech, facial droop, fevers, chills, abd pain. Of note, the pt states she was recently started on a diuretic from the pmd.

## 2023-04-23 NOTE — ED ADULT NURSE NOTE - NSIMPLEMENTINTERV_GEN_ALL_ED
Implemented All Fall with Harm Risk Interventions:  [place] to call system. Call bell, personal items and telephone within reach. Instruct patient to call for assistance. Room bathroom lighting operational. Non-slip footwear when patient is off stretcher. Physically safe environment: no spills, clutter or unnecessary equipment. Stretcher in lowest position, wheels locked, appropriate side rails in place. Provide visual cue, wrist band, yellow gown, etc. Monitor gait and stability. Monitor for mental status changes and reorient to person, place, and time. Review medications for side effects contributing to fall risk. Reinforce activity limits and safety measures with patient and family. Provide visual clues: red socks.

## 2023-04-23 NOTE — ED ADULT NURSE NOTE - OBJECTIVE STATEMENT
Assumed care of pt A&Ox4, resp even/unlabored, ambulatory with assist of RN, slow, steady gait noted, presenting to ED with c/o weakness. Pt reports significant PMHx of TIA's, HTN, and RA, presenting with weakness that began 2 days ago. Pt states last known well was 2130 yesterday evening when she went to bed, woke up today with B/L LE generalized weakness, no swelling or pitting edema noted, +BL LE pulses assessed. Pt states "my legs keep giving out when walking". Pt also reports LUE twitching, and an episode earlier today of lightheadedness with blurry vision. Pt reports lightheadedness and blurry vison has now resolved. Pt denies SOB, n/v/d, facial droop, slurred speech, chills, fever, or headache. No acute distress noted, pt placed on continuos cardiac monitoring, NSR on monitor, rate 79, SpO2 98% on room air.

## 2023-04-23 NOTE — H&P ADULT - HISTORY OF PRESENT ILLNESS
72 y/o female with PMH of TIAs x 5, RA, chronic back pain, HTN came to the ED complaining of generalized weakness that started earlier today. She reported twitching of her b/l UEs, LE weakness and knees buckled when she got up. She reported that earlier today she had an episode in which she felt lightheaded and her vision was blurry but that has since resolved. She has no chest pain, shortness of breath, palpitation, fever, nausea, vomiting, abdominal pain, change in bowel/urinary habit, hematuria, numbness, HA, fall. Of note, she noted taken HCTZ 25mg x 1 week because she was on a cruise and had b/l LE edema, She finished few days ago.

## 2023-04-23 NOTE — ED PROVIDER NOTE - CLINICAL SUMMARY MEDICAL DECISION MAKING FREE TEXT BOX
Pt with generalized weakness and found to have MIGUEL ÁNGEL and uti, which are likely causative etiologies. Pt to be admitted to medicine

## 2023-04-23 NOTE — ED ADULT NURSE NOTE - NS ED NURSE REPORT GIVEN TO FT
Report given to CDU SUNDAY Brandt. Patient A&Ox4, respirations even/unlabored, no apparent distress. Plan of care discussed with patient all questions answered.

## 2023-04-23 NOTE — H&P ADULT - ASSESSMENT
74 y/o female with PMH of TIAs x 5, RA, chronic back pain, HTN came to the ED complaining of generalized weakness that started earlier today. She reported twitching of her b/l UEs, LE weakness and knees buckled when she got up. She reported that earlier today she had an episode in which she felt lightheaded and her vision was blurry but that has since resolved. In the ED, CT head: no intracranial finding, UA with pyuria. BMP with elevated Cr, likely the reason for twitching in UEs.     Generalized weakness likely due to UTI   Admit to medical floor   UA noted   Rocephin once given in the ED, will continue   PT eval in AM, patient lives alone, for safe discharge   Fall precaution     MIGUEL ÁNGEL   Cr. 239 (baseline 0.8-1.4)   Likely due to recent HCTZ use   1L of NS ordered   Will hold Valsartan for now,  Monitor BMP     HTN/HLD/TIA   Aspirin 81mg   Plavix 75mg   Amlodipine 10mg with holding parameter  Metoprolol ER 100mg with holding parameters   Valsartan 320mg on hold due to MIGUEL ÁNGEL, restart as needed    72 y/o female with PMH of TIAs x 5, RA, chronic back pain, HTN came to the ED complaining of generalized weakness that started earlier today. She reported twitching of her b/l UEs, LE weakness and knees buckled when she got up. She reported that earlier today she had an episode in which she felt lightheaded and her vision was blurry but that has since resolved. In the ED, CT head: no intracranial finding, UA with pyuria. BMP with elevated Cr, likely the reason for twitching in UEs.     Generalized weakness likely due to UTI   Admit to medical floor   UA noted   Rocephin once given in the ED, will continue   PT eval in AM, patient lives alone, for safe discharge   Fall precaution     MIGUEL ÁNGEL   Cr. 239 (baseline 0.8-1.4)   Likely due to recent HCTZ use   1L of NS ordered   Will hold Valsartan for now,  Monitor BMP     HTN/HLD/TIA   Aspirin 81mg   Plavix 75mg   Amlodipine 10mg with holding parameter  Metoprolol ER 100mg with holding parameters   Valsartan 320mg on hold due to MIGUEL ÁNGEL, restart as needed   Rosuvastatin 10mg     RA/Chronic back pain  Oxycodone 10mg q6h PRN   Oxycontin 10mg HS   Gabapentin 300mg bid   Senna 2 tabs bowel regimen     Supportive   DVT prophylaxis: Heparin sc  Diet: DASH     Plan of care discussed with patient

## 2023-04-24 LAB
ANION GAP SERPL CALC-SCNC: 15 MMOL/L — SIGNIFICANT CHANGE UP (ref 5–17)
BUN SERPL-MCNC: 37.9 MG/DL — HIGH (ref 8–20)
CALCIUM SERPL-MCNC: 9.3 MG/DL — SIGNIFICANT CHANGE UP (ref 8.4–10.5)
CHLORIDE SERPL-SCNC: 100 MMOL/L — SIGNIFICANT CHANGE UP (ref 96–108)
CO2 SERPL-SCNC: 22 MMOL/L — SIGNIFICANT CHANGE UP (ref 22–29)
CREAT SERPL-MCNC: 1.73 MG/DL — HIGH (ref 0.5–1.3)
EGFR: 31 ML/MIN/1.73M2 — LOW
GLUCOSE SERPL-MCNC: 132 MG/DL — HIGH (ref 70–99)
HCT VFR BLD CALC: 32.1 % — LOW (ref 34.5–45)
HGB BLD-MCNC: 10.3 G/DL — LOW (ref 11.5–15.5)
MCHC RBC-ENTMCNC: 29.6 PG — SIGNIFICANT CHANGE UP (ref 27–34)
MCHC RBC-ENTMCNC: 32.1 GM/DL — SIGNIFICANT CHANGE UP (ref 32–36)
MCV RBC AUTO: 92.2 FL — SIGNIFICANT CHANGE UP (ref 80–100)
PLATELET # BLD AUTO: 149 K/UL — LOW (ref 150–400)
POTASSIUM SERPL-MCNC: 4.4 MMOL/L — SIGNIFICANT CHANGE UP (ref 3.5–5.3)
POTASSIUM SERPL-SCNC: 4.4 MMOL/L — SIGNIFICANT CHANGE UP (ref 3.5–5.3)
RBC # BLD: 3.48 M/UL — LOW (ref 3.8–5.2)
RBC # FLD: 13.6 % — SIGNIFICANT CHANGE UP (ref 10.3–14.5)
SODIUM SERPL-SCNC: 137 MMOL/L — SIGNIFICANT CHANGE UP (ref 135–145)
WBC # BLD: 5.76 K/UL — SIGNIFICANT CHANGE UP (ref 3.8–10.5)
WBC # FLD AUTO: 5.76 K/UL — SIGNIFICANT CHANGE UP (ref 3.8–10.5)

## 2023-04-24 PROCEDURE — 99232 SBSQ HOSP IP/OBS MODERATE 35: CPT

## 2023-04-24 RX ORDER — OXYCODONE HYDROCHLORIDE 5 MG/1
1 TABLET ORAL
Refills: 0 | DISCHARGE

## 2023-04-24 RX ORDER — SODIUM CHLORIDE 9 MG/ML
1000 INJECTION INTRAMUSCULAR; INTRAVENOUS; SUBCUTANEOUS
Refills: 0 | Status: DISCONTINUED | OUTPATIENT
Start: 2023-04-24 | End: 2023-04-25

## 2023-04-24 RX ORDER — ATORVASTATIN CALCIUM 80 MG/1
40 TABLET, FILM COATED ORAL AT BEDTIME
Refills: 0 | Status: DISCONTINUED | OUTPATIENT
Start: 2023-04-24 | End: 2023-04-25

## 2023-04-24 RX ORDER — SODIUM CHLORIDE 9 MG/ML
1000 INJECTION INTRAMUSCULAR; INTRAVENOUS; SUBCUTANEOUS ONCE
Refills: 0 | Status: COMPLETED | OUTPATIENT
Start: 2023-04-24 | End: 2023-04-24

## 2023-04-24 RX ORDER — OXYCODONE HYDROCHLORIDE 5 MG/1
10 TABLET ORAL EVERY 6 HOURS
Refills: 0 | Status: DISCONTINUED | OUTPATIENT
Start: 2023-04-24 | End: 2023-04-25

## 2023-04-24 RX ORDER — GABAPENTIN 400 MG/1
300 CAPSULE ORAL
Refills: 0 | Status: DISCONTINUED | OUTPATIENT
Start: 2023-04-24 | End: 2023-04-25

## 2023-04-24 RX ORDER — SENNA PLUS 8.6 MG/1
2 TABLET ORAL AT BEDTIME
Refills: 0 | Status: DISCONTINUED | OUTPATIENT
Start: 2023-04-24 | End: 2023-04-25

## 2023-04-24 RX ORDER — AMLODIPINE BESYLATE 2.5 MG/1
10 TABLET ORAL DAILY
Refills: 0 | Status: DISCONTINUED | OUTPATIENT
Start: 2023-04-24 | End: 2023-04-25

## 2023-04-24 RX ORDER — OXYCODONE HYDROCHLORIDE 5 MG/1
10 TABLET ORAL
Refills: 0 | Status: DISCONTINUED | OUTPATIENT
Start: 2023-04-24 | End: 2023-04-25

## 2023-04-24 RX ORDER — METOPROLOL TARTRATE 50 MG
100 TABLET ORAL DAILY
Refills: 0 | Status: DISCONTINUED | OUTPATIENT
Start: 2023-04-24 | End: 2023-04-25

## 2023-04-24 RX ORDER — GABAPENTIN 400 MG/1
0 CAPSULE ORAL
Refills: 0 | DISCHARGE

## 2023-04-24 RX ORDER — GABAPENTIN 400 MG/1
1 CAPSULE ORAL
Qty: 0 | Refills: 0 | DISCHARGE

## 2023-04-24 RX ORDER — HEPARIN SODIUM 5000 [USP'U]/ML
5000 INJECTION INTRAVENOUS; SUBCUTANEOUS EVERY 8 HOURS
Refills: 0 | Status: DISCONTINUED | OUTPATIENT
Start: 2023-04-24 | End: 2023-04-25

## 2023-04-24 RX ORDER — ASPIRIN/CALCIUM CARB/MAGNESIUM 324 MG
81 TABLET ORAL DAILY
Refills: 0 | Status: DISCONTINUED | OUTPATIENT
Start: 2023-04-24 | End: 2023-04-25

## 2023-04-24 RX ORDER — CEFTRIAXONE 500 MG/1
1000 INJECTION, POWDER, FOR SOLUTION INTRAMUSCULAR; INTRAVENOUS EVERY 24 HOURS
Refills: 0 | Status: DISCONTINUED | OUTPATIENT
Start: 2023-04-24 | End: 2023-04-25

## 2023-04-24 RX ORDER — CHOLECALCIFEROL (VITAMIN D3) 125 MCG
5000 CAPSULE ORAL DAILY
Refills: 0 | Status: DISCONTINUED | OUTPATIENT
Start: 2023-04-24 | End: 2023-04-25

## 2023-04-24 RX ORDER — CLOPIDOGREL BISULFATE 75 MG/1
75 TABLET, FILM COATED ORAL DAILY
Refills: 0 | Status: DISCONTINUED | OUTPATIENT
Start: 2023-04-24 | End: 2023-04-25

## 2023-04-24 RX ADMIN — SENNA PLUS 2 TABLET(S): 8.6 TABLET ORAL at 21:07

## 2023-04-24 RX ADMIN — SODIUM CHLORIDE 1000 MILLILITER(S): 9 INJECTION INTRAMUSCULAR; INTRAVENOUS; SUBCUTANEOUS at 01:08

## 2023-04-24 RX ADMIN — GABAPENTIN 300 MILLIGRAM(S): 400 CAPSULE ORAL at 05:48

## 2023-04-24 RX ADMIN — Medication 1 TABLET(S): at 13:20

## 2023-04-24 RX ADMIN — OXYCODONE HYDROCHLORIDE 10 MILLIGRAM(S): 5 TABLET ORAL at 18:00

## 2023-04-24 RX ADMIN — Medication 81 MILLIGRAM(S): at 12:21

## 2023-04-24 RX ADMIN — CEFTRIAXONE 1000 MILLIGRAM(S): 500 INJECTION, POWDER, FOR SOLUTION INTRAMUSCULAR; INTRAVENOUS at 10:06

## 2023-04-24 RX ADMIN — SODIUM CHLORIDE 75 MILLILITER(S): 9 INJECTION INTRAMUSCULAR; INTRAVENOUS; SUBCUTANEOUS at 21:04

## 2023-04-24 RX ADMIN — OXYCODONE HYDROCHLORIDE 10 MILLIGRAM(S): 5 TABLET ORAL at 21:06

## 2023-04-24 RX ADMIN — OXYCODONE HYDROCHLORIDE 10 MILLIGRAM(S): 5 TABLET ORAL at 22:00

## 2023-04-24 RX ADMIN — OXYCODONE HYDROCHLORIDE 10 MILLIGRAM(S): 5 TABLET ORAL at 09:45

## 2023-04-24 RX ADMIN — HEPARIN SODIUM 5000 UNIT(S): 5000 INJECTION INTRAVENOUS; SUBCUTANEOUS at 05:48

## 2023-04-24 RX ADMIN — ATORVASTATIN CALCIUM 40 MILLIGRAM(S): 80 TABLET, FILM COATED ORAL at 21:06

## 2023-04-24 RX ADMIN — HEPARIN SODIUM 5000 UNIT(S): 5000 INJECTION INTRAVENOUS; SUBCUTANEOUS at 21:05

## 2023-04-24 RX ADMIN — GABAPENTIN 300 MILLIGRAM(S): 400 CAPSULE ORAL at 17:29

## 2023-04-24 RX ADMIN — OXYCODONE HYDROCHLORIDE 10 MILLIGRAM(S): 5 TABLET ORAL at 10:15

## 2023-04-24 RX ADMIN — Medication 5000 UNIT(S): at 13:20

## 2023-04-24 RX ADMIN — HEPARIN SODIUM 5000 UNIT(S): 5000 INJECTION INTRAVENOUS; SUBCUTANEOUS at 15:50

## 2023-04-24 RX ADMIN — SODIUM CHLORIDE 75 MILLILITER(S): 9 INJECTION INTRAMUSCULAR; INTRAVENOUS; SUBCUTANEOUS at 11:03

## 2023-04-24 RX ADMIN — OXYCODONE HYDROCHLORIDE 10 MILLIGRAM(S): 5 TABLET ORAL at 17:29

## 2023-04-24 RX ADMIN — CLOPIDOGREL BISULFATE 75 MILLIGRAM(S): 75 TABLET, FILM COATED ORAL at 12:20

## 2023-04-24 NOTE — PATIENT PROFILE ADULT - FALL HARM RISK - HARM RISK INTERVENTIONS

## 2023-04-24 NOTE — PATIENT PROFILE ADULT - FUNCTIONAL ASSESSMENT - BASIC MOBILITY 6.
2-calculated by average/Not able to assess (calculate score using [facility] averaging method)

## 2023-04-24 NOTE — GOALS OF CARE CONVERSATION - ADVANCED CARE PLANNING - CONVERSATION DETAILS
Advance care directive discussed with patient. She has not appointed any health care proxy but she said in case unable to make medical decision, Analy (her daughter) should be contact to make decision. She has not discussed goal of care either with family. She has no restriction on medical management, patient is full code.

## 2023-04-25 ENCOUNTER — TRANSCRIPTION ENCOUNTER (OUTPATIENT)
Age: 73
End: 2023-04-25

## 2023-04-25 VITALS
OXYGEN SATURATION: 98 % | TEMPERATURE: 99 F | SYSTOLIC BLOOD PRESSURE: 106 MMHG | HEART RATE: 70 BPM | DIASTOLIC BLOOD PRESSURE: 51 MMHG | RESPIRATION RATE: 18 BRPM

## 2023-04-25 LAB
ALBUMIN SERPL ELPH-MCNC: 4.1 G/DL — SIGNIFICANT CHANGE UP (ref 3.3–5.2)
ALP SERPL-CCNC: 66 U/L — SIGNIFICANT CHANGE UP (ref 40–120)
ALT FLD-CCNC: 32 U/L — SIGNIFICANT CHANGE UP
ANION GAP SERPL CALC-SCNC: 12 MMOL/L — SIGNIFICANT CHANGE UP (ref 5–17)
AST SERPL-CCNC: 36 U/L — HIGH
BILIRUB SERPL-MCNC: 0.4 MG/DL — SIGNIFICANT CHANGE UP (ref 0.4–2)
BUN SERPL-MCNC: 21.4 MG/DL — HIGH (ref 8–20)
CALCIUM SERPL-MCNC: 10.2 MG/DL — SIGNIFICANT CHANGE UP (ref 8.4–10.5)
CHLORIDE SERPL-SCNC: 103 MMOL/L — SIGNIFICANT CHANGE UP (ref 96–108)
CO2 SERPL-SCNC: 23 MMOL/L — SIGNIFICANT CHANGE UP (ref 22–29)
CREAT SERPL-MCNC: 1.05 MG/DL — SIGNIFICANT CHANGE UP (ref 0.5–1.3)
CULTURE RESULTS: SIGNIFICANT CHANGE UP
EGFR: 56 ML/MIN/1.73M2 — LOW
GLUCOSE SERPL-MCNC: 109 MG/DL — HIGH (ref 70–99)
HCT VFR BLD CALC: 37.2 % — SIGNIFICANT CHANGE UP (ref 34.5–45)
HGB BLD-MCNC: 11.8 G/DL — SIGNIFICANT CHANGE UP (ref 11.5–15.5)
MCHC RBC-ENTMCNC: 29.2 PG — SIGNIFICANT CHANGE UP (ref 27–34)
MCHC RBC-ENTMCNC: 31.7 GM/DL — LOW (ref 32–36)
MCV RBC AUTO: 92.1 FL — SIGNIFICANT CHANGE UP (ref 80–100)
PLATELET # BLD AUTO: 163 K/UL — SIGNIFICANT CHANGE UP (ref 150–400)
POTASSIUM SERPL-MCNC: 4.7 MMOL/L — SIGNIFICANT CHANGE UP (ref 3.5–5.3)
POTASSIUM SERPL-SCNC: 4.7 MMOL/L — SIGNIFICANT CHANGE UP (ref 3.5–5.3)
PROT SERPL-MCNC: 7 G/DL — SIGNIFICANT CHANGE UP (ref 6.6–8.7)
RBC # BLD: 4.04 M/UL — SIGNIFICANT CHANGE UP (ref 3.8–5.2)
RBC # FLD: 13.3 % — SIGNIFICANT CHANGE UP (ref 10.3–14.5)
SODIUM SERPL-SCNC: 138 MMOL/L — SIGNIFICANT CHANGE UP (ref 135–145)
SPECIMEN SOURCE: SIGNIFICANT CHANGE UP
WBC # BLD: 4.79 K/UL — SIGNIFICANT CHANGE UP (ref 3.8–10.5)
WBC # FLD AUTO: 4.79 K/UL — SIGNIFICANT CHANGE UP (ref 3.8–10.5)

## 2023-04-25 PROCEDURE — 83880 ASSAY OF NATRIURETIC PEPTIDE: CPT

## 2023-04-25 PROCEDURE — 81001 URINALYSIS AUTO W/SCOPE: CPT

## 2023-04-25 PROCEDURE — 83735 ASSAY OF MAGNESIUM: CPT

## 2023-04-25 PROCEDURE — 80053 COMPREHEN METABOLIC PANEL: CPT

## 2023-04-25 PROCEDURE — 85025 COMPLETE CBC W/AUTO DIFF WBC: CPT

## 2023-04-25 PROCEDURE — 80048 BASIC METABOLIC PNL TOTAL CA: CPT

## 2023-04-25 PROCEDURE — 84443 ASSAY THYROID STIM HORMONE: CPT

## 2023-04-25 PROCEDURE — 99285 EMERGENCY DEPT VISIT HI MDM: CPT

## 2023-04-25 PROCEDURE — 85027 COMPLETE CBC AUTOMATED: CPT

## 2023-04-25 PROCEDURE — 87086 URINE CULTURE/COLONY COUNT: CPT

## 2023-04-25 PROCEDURE — 70450 CT HEAD/BRAIN W/O DYE: CPT | Mod: MA

## 2023-04-25 PROCEDURE — 93005 ELECTROCARDIOGRAM TRACING: CPT

## 2023-04-25 PROCEDURE — 36415 COLL VENOUS BLD VENIPUNCTURE: CPT

## 2023-04-25 PROCEDURE — 84436 ASSAY OF TOTAL THYROXINE: CPT

## 2023-04-25 PROCEDURE — 99239 HOSP IP/OBS DSCHRG MGMT >30: CPT

## 2023-04-25 PROCEDURE — 82962 GLUCOSE BLOOD TEST: CPT

## 2023-04-25 PROCEDURE — 96374 THER/PROPH/DIAG INJ IV PUSH: CPT

## 2023-04-25 PROCEDURE — 84484 ASSAY OF TROPONIN QUANT: CPT

## 2023-04-25 RX ORDER — LANOLIN ALCOHOL/MO/W.PET/CERES
3 CREAM (GRAM) TOPICAL
Qty: 0 | Refills: 0 | DISCHARGE

## 2023-04-25 RX ORDER — BACITRACIN ZINC 500 UNIT/G
1 OINTMENT IN PACKET (EA) TOPICAL THREE TIMES A DAY
Refills: 0 | Status: DISCONTINUED | OUTPATIENT
Start: 2023-04-25 | End: 2023-04-25

## 2023-04-25 RX ADMIN — OXYCODONE HYDROCHLORIDE 10 MILLIGRAM(S): 5 TABLET ORAL at 07:39

## 2023-04-25 RX ADMIN — OXYCODONE HYDROCHLORIDE 10 MILLIGRAM(S): 5 TABLET ORAL at 01:00

## 2023-04-25 RX ADMIN — OXYCODONE HYDROCHLORIDE 10 MILLIGRAM(S): 5 TABLET ORAL at 13:27

## 2023-04-25 RX ADMIN — OXYCODONE HYDROCHLORIDE 10 MILLIGRAM(S): 5 TABLET ORAL at 00:06

## 2023-04-25 RX ADMIN — CLOPIDOGREL BISULFATE 75 MILLIGRAM(S): 75 TABLET, FILM COATED ORAL at 12:15

## 2023-04-25 RX ADMIN — CEFTRIAXONE 1000 MILLIGRAM(S): 500 INJECTION, POWDER, FOR SOLUTION INTRAMUSCULAR; INTRAVENOUS at 12:14

## 2023-04-25 RX ADMIN — Medication 3 MILLIGRAM(S): at 00:48

## 2023-04-25 RX ADMIN — Medication 100 MILLIGRAM(S): at 05:06

## 2023-04-25 RX ADMIN — OXYCODONE HYDROCHLORIDE 10 MILLIGRAM(S): 5 TABLET ORAL at 14:00

## 2023-04-25 RX ADMIN — Medication 1 TABLET(S): at 12:15

## 2023-04-25 RX ADMIN — GABAPENTIN 300 MILLIGRAM(S): 400 CAPSULE ORAL at 05:06

## 2023-04-25 RX ADMIN — Medication 81 MILLIGRAM(S): at 12:15

## 2023-04-25 RX ADMIN — AMLODIPINE BESYLATE 10 MILLIGRAM(S): 2.5 TABLET ORAL at 05:06

## 2023-04-25 RX ADMIN — OXYCODONE HYDROCHLORIDE 10 MILLIGRAM(S): 5 TABLET ORAL at 08:10

## 2023-04-25 RX ADMIN — Medication 1 APPLICATION(S): at 05:24

## 2023-04-25 RX ADMIN — HEPARIN SODIUM 5000 UNIT(S): 5000 INJECTION INTRAVENOUS; SUBCUTANEOUS at 05:06

## 2023-04-25 NOTE — PHYSICAL THERAPY INITIAL EVALUATION ADULT - PERTINENT HX OF CURRENT PROBLEM, REHAB EVAL
72 y/o female with PMH of TIAs x 5, RA, chronic back pain, HTN came to the ED complaining of generalized weakness that started earlier today. She reported twitching of her b/l UEs, LE weakness and knees buckled when she got up. She reported that earlier today she had an episode in which she felt lightheaded and her vision was blurry but that has since resolved.

## 2023-04-25 NOTE — DISCHARGE NOTE PROVIDER - NSDCFUSCHEDAPPT_GEN_ALL_CORE_FT
Micky Gomez  [facility]  NEUROSURG [address] [place] [address]  Scheduled Appointment: 05/09/2023

## 2023-04-25 NOTE — PROGRESS NOTE ADULT - SUBJECTIVE AND OBJECTIVE BOX
[facility] Division of Hospital Medicine  Theodore Zafar MD    Chief Complaint:  Patient is a 73y old  Female who presents with a chief complaint of     SUBJECTIVE / OVERNIGHT EVENTS:  Patient seen and examined at bedside. No acute events reported overnight. No new complaints. Feeling much better.     MEDICATIONS  (STANDING):  amLODIPine   Tablet 10 milliGRAM(s) Oral daily  aspirin  chewable 81 milliGRAM(s) Oral daily  atorvastatin 40 milliGRAM(s) Oral at bedtime  bacitracin   Ointment 1 Application(s) Topical three times a day  cefTRIAXone Injectable. 1000 milliGRAM(s) IV Push every 24 hours  cholecalciferol 5000 Unit(s) Oral daily  clopidogrel Tablet 75 milliGRAM(s) Oral daily  gabapentin 300 milliGRAM(s) Oral two times a day  heparin   Injectable 5000 Unit(s) SubCutaneous every 8 hours  metoprolol succinate  milliGRAM(s) Oral daily  multivitamin 1 Tablet(s) Oral daily  oxyCODONE  ER Tablet 10 milliGRAM(s) Oral <User Schedule>  senna 2 Tablet(s) Oral at bedtime    MEDICATIONS  (PRN):  acetaminophen     Tablet .. 650 milliGRAM(s) Oral every 6 hours PRN Temp greater or equal to 38C (100.4F), Mild Pain (1 - 3)  aluminum hydroxide/magnesium hydroxide/simethicone Suspension 30 milliLiter(s) Oral every 4 hours PRN Dyspepsia  melatonin 3 milliGRAM(s) Oral at bedtime PRN Insomnia  ondansetron Injectable 4 milliGRAM(s) IV Push every 8 hours PRN Nausea and/or Vomiting  oxyCODONE    IR 10 milliGRAM(s) Oral every 6 hours PRN Severe Pain (7 - 10)        I&O's Summary    2023 07:01  -  2023 07:00  --------------------------------------------------------  IN: 600 mL / OUT: 0 mL / NET: 600 mL        PHYSICAL EXAM:  Vital Signs Last 24 Hrs  T(C): 36.8 (2023 04:28), Max: 36.9 (2023 00:01)  T(F): 98.3 (2023 04:28), Max: 98.5 (2023 00:01)  HR: 80 (2023 04:28) (79 - 82)  BP: 105/64 (2023 04:28) (105/64 - 116/46)  BP(mean): --  RR: 18 (2023 04:28) (18 - 18)  SpO2: 94% (2023 04:28) (94% - 100%)    Parameters below as of 2023 04:28  Patient On (Oxygen Delivery Method): room air            CONSTITUTIONAL: NAD  HEENT: NC/AT, PERRL, no JVD  RESPIRATORY: CTA bilaterally, normal effort  CARDIOVASCULAR: RRR, S1/S2+, no m/g/r  ABDOMEN: Nontender to palpation, normoactive bowel sounds, no rebound/guarding  MUSCULOSKELETAL: No edema, cyanosis or deformities.  PSYCH: Calm, affect appropriate.  NEUROLOGY: Awake, alert, no focal neurological deficits.   SKIN: No rashes; no palpable lesions  VASC: Distal pulses palpable    LABS:                        11.8   4.79  )-----------( 163      ( 2023 04:30 )             37.2     04-25    138  |  103  |  21.4<H>  ----------------------------<  109<H>  4.7   |  23.0  |  1.05    Ca    10.2      2023 04:30  Mg     2.5     04-23    TPro  7.0  /  Alb  4.1  /  TBili  0.4  /  DBili  x   /  AST  36<H>  /  ALT  32  /  AlkPhos  66  04-25      CARDIAC MARKERS ( 2023 20:00 )  x     / <0.01 ng/mL / x     / x     / x          Urinalysis Basic - ( 2023 20:50 )    Color: Yellow / Appearance: Clear / S.025 / pH: x  Gluc: x / Ketone: Negative  / Bili: Negative / Urobili: Negative mg/dL   Blood: x / Protein: Negative / Nitrite: Negative   Leuk Esterase: Moderate / RBC: 0-2 /HPF / WBC 11-25 /HPF   Sq Epi: x / Non Sq Epi: x / Bacteria: Occasional        CAPILLARY BLOOD GLUCOSE            RADIOLOGY & ADDITIONAL TESTS:  Results Reviewed:   Imaging Personally Reviewed:  Electrocardiogram Personally Reviewed:                                          
[facility] Division of Hospital Medicine  Theodore Zafar MD    Chief Complaint:  Patient is a 73y old  Female who presents with a chief complaint of     SUBJECTIVE / OVERNIGHT EVENTS:  Patient seen and examined at bedside. No acute events reported overnight. No new complaints.    MEDICATIONS  (STANDING):  amLODIPine   Tablet 10 milliGRAM(s) Oral daily  aspirin  chewable 81 milliGRAM(s) Oral daily  atorvastatin 40 milliGRAM(s) Oral at bedtime  cefTRIAXone Injectable. 1000 milliGRAM(s) IV Push every 24 hours  cholecalciferol 5000 Unit(s) Oral daily  clopidogrel Tablet 75 milliGRAM(s) Oral daily  gabapentin 300 milliGRAM(s) Oral two times a day  heparin   Injectable 5000 Unit(s) SubCutaneous every 8 hours  metoprolol succinate  milliGRAM(s) Oral daily  multivitamin 1 Tablet(s) Oral daily  oxyCODONE  ER Tablet 10 milliGRAM(s) Oral <User Schedule>  senna 2 Tablet(s) Oral at bedtime  sodium chloride 0.9%. 1000 milliLiter(s) (75 mL/Hr) IV Continuous <Continuous>    MEDICATIONS  (PRN):  acetaminophen     Tablet .. 650 milliGRAM(s) Oral every 6 hours PRN Temp greater or equal to 38C (100.4F), Mild Pain (1 - 3)  aluminum hydroxide/magnesium hydroxide/simethicone Suspension 30 milliLiter(s) Oral every 4 hours PRN Dyspepsia  melatonin 3 milliGRAM(s) Oral at bedtime PRN Insomnia  ondansetron Injectable 4 milliGRAM(s) IV Push every 8 hours PRN Nausea and/or Vomiting  oxyCODONE    IR 10 milliGRAM(s) Oral every 6 hours PRN Severe Pain (7 - 10)        I&O's Summary      PHYSICAL EXAM:  Vital Signs Last 24 Hrs  T(C): 36.6 (2023 05:40), Max: 37.1 (2023 19:12)  T(F): 97.8 (2023 05:40), Max: 98.8 (2023 19:12)  HR: 74 (2023 05:40) (71 - 74)  BP: 100/63 (2023 05:40) (94/48 - 116/60)  BP(mean): --  RR: 18 (2023 05:40) (18 - 20)  SpO2: 98% (2023 05:40) (96% - 99%)    Parameters below as of 2023 05:40  Patient On (Oxygen Delivery Method): room air            CONSTITUTIONAL: NAD  HEENT: NC/AT, PERRL, no JVD  RESPIRATORY: CTA bilaterally, normal effort  CARDIOVASCULAR: RRR, S1/S2+, no m/g/r  ABDOMEN: Nontender to palpation, normoactive bowel sounds, no rebound/guarding  MUSCULOSKELETAL: No edema, cyanosis or deformities.  PSYCH: Calm, affect appropriate.  NEUROLOGY: Awake, alert, no focal neurological deficits.   SKIN: No rashes; no palpable lesions  VASC: Distal pulses palpable    LABS:                        10.3   5.76  )-----------( 149      ( 2023 03:32 )             32.1     04-24    137  |  100  |  37.9<H>  ----------------------------<  132<H>  4.4   |  22.0  |  1.73<H>    Ca    9.3      2023 03:32  Mg     2.5     04-23    TPro  6.6  /  Alb  4.0  /  TBili  0.4  /  DBili  x   /  AST  38<H>  /  ALT  35<H>  /  AlkPhos  63  04-23      CARDIAC MARKERS ( 2023 20:00 )  x     / <0.01 ng/mL / x     / x     / x          Urinalysis Basic - ( 2023 20:50 )    Color: Yellow / Appearance: Clear / S.025 / pH: x  Gluc: x / Ketone: Negative  / Bili: Negative / Urobili: Negative mg/dL   Blood: x / Protein: Negative / Nitrite: Negative   Leuk Esterase: Moderate / RBC: 0-2 /HPF / WBC 11-25 /HPF   Sq Epi: x / Non Sq Epi: x / Bacteria: Occasional        CAPILLARY BLOOD GLUCOSE      POCT Blood Glucose.: 158 mg/dL (2023 19:11)        RADIOLOGY & ADDITIONAL TESTS:  Results Reviewed:   Imaging Personally Reviewed:  Electrocardiogram Personally Reviewed:

## 2023-04-25 NOTE — DISCHARGE NOTE PROVIDER - NSDCCPCAREPLAN_GEN_ALL_CORE_FT
PRINCIPAL DISCHARGE DIAGNOSIS  Diagnosis: Acute UTI  Assessment and Plan of Treatment: Completed Ceftriaxone. Likely cause of generalized weakness. Follow up with Primary Care.      SECONDARY DISCHARGE DIAGNOSES  Diagnosis: Acute UTI  Assessment and Plan of Treatment:     Diagnosis: MIGUEL ÁNGEL (acute kidney injury)  Assessment and Plan of Treatment: likely secondary to dehydration and UTI. Resolved with IVFs. Follow up with your Primary Care for repeat BMP outpatient.

## 2023-04-25 NOTE — PROGRESS NOTE ADULT - ASSESSMENT
74 y/o female with PMH of TIAs x 5, RA, chronic back pain, HTN came to the ED complaining of generalized weakness that started earlier today. She reported twitching of her b/l UEs, LE weakness and knees buckled when she got up. She reported that earlier today she had an episode in which she felt lightheaded and her vision was blurry but that has since resolved. In the ED, CT head: no intracranial finding, UA with pyuria. BMP with elevated Cr, likely the reason for twitching in UEs.     Generalized weakness likely due to UTI   - U/a noted  - UCx  - C/w Rocephin  - PT eval  - Fall precautions    MIGUEL ÁNGEL   - Cr. 239 (baseline 0.8-1.4)   - Likely due to recent HCTZ use  - Hold Valsartan  - IVFs  - Improving  - Monitor BMP      HTN/HLD/TIA   - Aspirin 81mg   - Plavix 75mg   - Amlodipine 10mg with holding parameter  - Metoprolol ER 100mg with holding parameters   - Valsartan 320mg on hold due to MIGUEL ÁNGEL, restart as needed   - Rosuvastatin 10mg     RA/Chronic back pain  - Oxycodone 10mg q6h PRN   - Oxycontin 10mg HS   - Gabapentin 300mg bid   - Senna 2 tabs bowel regimen     Supportive   DVT prophylaxis: Heparin sc  Diet: DASH   
74 y/o female with PMH of TIAs x 5, RA, chronic back pain, HTN came to the ED complaining of generalized weakness that started earlier today. She reported twitching of her b/l UEs, LE weakness and knees buckled when she got up. She reported that earlier today she had an episode in which she felt lightheaded and her vision was blurry but that has since resolved. In the ED, CT head: no intracranial finding, UA with pyuria. BMP with elevated Cr, likely the reason for twitching in UEs.     Generalized weakness likely due to UTI   - U/a noted  - UCx pending  - C/w Rocephin  - PT eval pending  - Fall precautions    MIGUEL ÁNGEL   - Cr. 239 (baseline 0.8-1.4)   - Likely due to recent HCTZ use and dehydration from UTI  - Resume ACEi  - S/p IVFs  - Improving  - Monitor BMP, correct electrolytes as needed    HTN/HLD/TIA   - Aspirin 81mg   - Plavix 75mg   - Amlodipine 10mg with holding parameter  - Metoprolol ER 100mg with holding parameters   - Resume Valsartan  - Rosuvastatin 10mg     RA/Chronic back pain  - Oxycodone 10mg q6h PRN   - Oxycontin 10mg HS   - Gabapentin 300mg bid   - Senna 2 tabs bowel regimen     Supportive   DVT prophylaxis: Heparin sc  Diet: DASH     Potential discharge pending PT evaluation

## 2023-04-25 NOTE — DISCHARGE NOTE PROVIDER - HOSPITAL COURSE
Patient is a 72 yo F who was admitted for generalized weakness 2/2 UTI. She was started on Ceftriaxone. UCx is pending. She was noted to have MIGUEL ÁNGEL which resolved with IVFs, likely due HCTZ and dehydration. She was evaluated by PT, no needs. Patient symptomatically improved, feels ready to go home. Pt no longer requires inpatient hospitalization and is stable for discharge.

## 2023-04-25 NOTE — DISCHARGE NOTE NURSING/CASE MANAGEMENT/SOCIAL WORK - NSDCPEFALRISK_GEN_ALL_CORE
For information on Fall & Injury Prevention, visit: [email or web address] OR  [email or web address] OR  https://www.cdc.gov/steadi/patient.html

## 2023-04-25 NOTE — PHYSICAL THERAPY INITIAL EVALUATION ADULT - ADDITIONAL COMMENTS
Patient is asking if she can have her work note extended to return on Monday due to her abdominal pain  Ok to do? Please email to [email or web address]  com Pt AxOx4 states she lives at home alone with 3STE 1HR without AD. Pt was independent PTA and has daughter who lives next door to assist.

## 2023-04-25 NOTE — DISCHARGE NOTE PROVIDER - NSDCMRMEDTOKEN_GEN_ALL_CORE_FT
amLODIPine 10 mg oral tablet: 1 tab(s) orally once a day  aspirin 81 mg oral tablet, chewable: 1 tab(s) orally once a day  cholecalciferol 125 mcg (5000 intl units) oral capsule: 1 cap(s) orally once a day  clopidogrel 75 mg oral tablet: 1 tab(s) orally once a day  diphenhydrAMINE 25 mg oral capsule: 1 cap(s) orally once a day (at bedtime), As Needed  gabapentin 300 mg oral tablet: orally 2 times a day  melatonin: at bedtime  metoprolol succinate 100 mg oral tablet, extended release: 1 tab(s) orally once a day  Multiple Vitamins oral tablet: 1 tab(s) orally once a day  Orencia Prefilled Syringe: every month  oxyCODONE 10 mg oral tablet: 1 tab(s) orally every 6 hours, As Needed  OxyCONTIN 10 mg oral tablet, extended release: 1 orally once a day (at bedtime)  rosuvastatin 10 mg oral tablet: 1 tab(s) orally once a day  senna leaf extract oral tablet: 2 tab(s) orally once a day (at bedtime)  valsartan 320 mg oral tablet: 1 tab(s) orally once a day Restart on 3/26. Please take your BP prior to restarting.  Vitamin B Complex oral tablet: 1 tab(s) orally once a day

## 2023-04-25 NOTE — DISCHARGE NOTE NURSING/CASE MANAGEMENT/SOCIAL WORK - PATIENT PORTAL LINK FT
You can access the FollowMyHealth Patient Portal offered by [facility] by registering at the following website: [email or web address]. By joining [facility]’s FollowMyHealth portal, you will also be able to view your health information using other applications (apps) compatible with our system.

## 2023-04-25 NOTE — DISCHARGE NOTE PROVIDER - ATTENDING DISCHARGE PHYSICAL EXAMINATION:
Vital Signs Last 24 Hrs  T(F): 98.7 (25 Apr 2023 13:00), Max: 98.7 (25 Apr 2023 13:00)  HR: 70 (25 Apr 2023 13:00) (70 - 82)  BP: 106/51 (25 Apr 2023 13:00) (105/64 - 116/46)  RR: 18 (25 Apr 2023 13:00) (18 - 18)  SpO2: 98% (25 Apr 2023 13:00) (94% - 100%)    Physical Exam:  Constitutional: NAD  HEENT: NC/AT, PERRL, EOMI, trachea midline, no JVD  Respiratory: CTA bilaterally, symmetrical chest rise  Cardiovascular: RRR, no m/g/r  Gastrointestinal: Soft, NT/ND, BS+  Vascular: 2+ peripheral pulses  Neurological: A/O x 3, no focal neurological deficits  Psych: Fair mood/affect  Musculoskeletal: No edema, cyanosis, deformities. ROM normal  Skin: No obvious rash, lesions. No jaundice.

## 2023-05-16 ENCOUNTER — APPOINTMENT (OUTPATIENT)
Dept: NEUROSURGERY | Facility: CLINIC | Age: 73
End: 2023-05-16
Payer: MEDICARE

## 2023-05-16 VITALS
SYSTOLIC BLOOD PRESSURE: 100 MMHG | HEART RATE: 68 BPM | TEMPERATURE: 97.7 F | BODY MASS INDEX: 38.64 KG/M2 | DIASTOLIC BLOOD PRESSURE: 63 MMHG | HEIGHT: 62 IN | OXYGEN SATURATION: 97 % | WEIGHT: 210 LBS

## 2023-05-16 DIAGNOSIS — I65.21 OCCLUSION AND STENOSIS OF RIGHT CAROTID ARTERY: ICD-10-CM

## 2023-05-16 PROCEDURE — 99213 OFFICE O/P EST LOW 20 MIN: CPT

## 2023-05-30 NOTE — DIETITIAN NUTRITION RISK NOTIFICATION - MALNUTRITION EVALUATION AS DEMONSTRATED BY (ADULTS > 20 YEARS OF AGE)
Not applicable Siliq Counseling:  I discussed with the patient the risks of Siliq including but not limited to new or worsening depression, suicidal thoughts and behavior, immunosuppression, malignancy, posterior leukoencephalopathy syndrome, and serious infections.  The patient understands that monitoring is required including a PPD at baseline and must alert us or the primary physician if symptoms of infection or other concerning signs are noted. There is also a special program designed to monitor depression which is required with Siliq.

## 2023-07-16 NOTE — DISCHARGE NOTE PROVIDER - ATTENDING ATTESTATION STATEMENT
I have personally seen and examined the patient. I have collaborated with and supervised the abdominal pain

## 2023-09-21 ASSESSMENT — HOOS JR
SITTING: MODERATE
HOOS JR RAW SCORE: 14
IMPORTED HOOS JR SCORE: 14.0
IMPORTED FORM: YES
LYING IN BED (TURNING OVER, MAINTAINING HIP POSITION): SEVERE
GOING UP OR DOWN STAIRS: MODERATE
BENDING TO THE FLOOR TO PICK UP OBJECT: SEVERE
WALKING ON UNEVEN SURFACE: MODERATE
RISING FROM SITTING: MODERATE

## 2023-11-10 NOTE — ED PROVIDER NOTE - ATTENDING CONTRIBUTION TO CARE
General
I personally saw the patient with the resident, and completed the key components of the history and physical exam. I then discussed the management plan with the resident.

## 2023-11-14 NOTE — ED ADULT TRIAGE NOTE - CHIEF COMPLAINT QUOTE
The following changes were made to your medications today:   Continue all present medications     The following lifestyle modifications are encouraged:  Continue regular exercise at least 30 minutes daily.  Lowfat/Low Cholesterol diet advised.      The following tests have been ordered:  none     Return to Clinic in 1 year or sooner if needed.   
pt a+ox3, BIBA from home c/o sudden onset of left hand weakness x1 hour ago. pt s/p TIA 2 days ago, reports worsening headache to lower back of head x 1 hour. MD Robertson called to bedside, code stroke activated.

## 2023-12-26 NOTE — ED ADULT TRIAGE NOTE - WEIGHT IN KG
Patient seen and examined at the bedside.    Remained critically ill on continuous ICU monitoring.      Brief Summary:  59yo M w/ PMHx HTN, CAD s/p stent (2009), ICH (2008), ICM now s/p OHT with IABP on 12/25/2023.      24 Hour events:         OBJECTIVE:  Vital Signs Last 24 Hrs  T(C): 36.7 (26 Dec 2023 04:00), Max: 37.1 (25 Dec 2023 08:45)  T(F): 98.1 (26 Dec 2023 04:00), Max: 98.8 (25 Dec 2023 08:45)  HR: 88 (26 Dec 2023 06:45) (80 - 90)  BP: --  BP(mean): --  RR: 17 (26 Dec 2023 06:45) (7 - 25)  SpO2: 98% (26 Dec 2023 06:45) (95% - 100%)    Parameters below as of 26 Dec 2023 06:08    O2 Flow (L/min): 50  O2 Concentration (%): 50    Mode: CPAP with PS  FiO2: 40  PEEP: 5  PS: 5  MAP: 8  PC:   PIP: 15              Physical Exam:   General: Intubated and Sedated            Neurology: intact, follows commands off sedation  Respiratory: on Ventilator, synchronous with ventilator  CV: sinus rhythm  Abdominal: Soft, ND  : +Llamas  Extremities: warm, well perfused, moving all extremities   Skin: No Rashes, Hematoma, Ecchymosis              -------------------------------------------------------------------------------------------------------------------------------    Labs:                        8.8    11.11 )-----------( 198      ( 26 Dec 2023 00:32 )             26.6     12-26    140  |  105  |  26<H>  ----------------------------<  117<H>  3.7   |  20<L>  |  1.41<H>    Ca    10.3      26 Dec 2023 00:31  Phos  3.1     12-26  Mg     2.3     12-26    TPro  6.6  /  Alb  4.3  /  TBili  0.4  /  DBili  x   /  AST  72<H>  /  ALT  33  /  AlkPhos  44  12-26    LIVER FUNCTIONS - ( 26 Dec 2023 00:31 )  Alb: 4.3 g/dL / Pro: 6.6 g/dL / ALK PHOS: 44 U/L / ALT: 33 U/L / AST: 72 U/L / GGT: x           PT/INR - ( 26 Dec 2023 00:33 )   PT: 13.3 sec;   INR: 1.22 ratio         PTT - ( 26 Dec 2023 00:33 )  PTT:27.1 sec  ABG - ( 26 Dec 2023 06:30 )  pH, Arterial: 7.36  pH, Blood: x     /  pCO2: 38    /  pO2: 150   / HCO3: 22    / Base Excess: -3.6  /  SaO2: 99.5              ------------------------------------------------------------------------------------------------------------------------------  Assessment:  59yo M w/ PMHx HTN, CAD s/p stent (2009), ICH (2008), ICM now s/p OHT with IABP on 12/25/2023.    S/p OHT on 12/25/2023  At risk for hemodynamic instability and cardiac arrhythmias  Post op respiratory insufficiency  Receiving inotropic medication  Acute blood loss anemia  Stress hyperglycemia        Plan:   ***Neuro***  - Sedated with Precedex gtt. Wean sedation as tolerated.  - Gabapentin, and PRNs for pain  - Tramadol q8      ***Cardiovascular***  - At risk for hemodynamic instability and cardiac arrhythmias.  - IVF for perioperative hypovolemia.  - IABP @ 1:1  - Continue Milrinone and Epinephrine for inotropic support.  - Cardene for Afterload reduction        ***Pulmonary***.  - Wean ventilator as tolerated.   - Deep breathing and coughing exercises.  - Sabrina at 10ppm.    ***GI***  - NPO for now.    - Protonix for stress ulcer prophylaxis   - Bowel regimen.  - Reglan for gut motility     ***Renal***  - Llamas catheter for strict I/O measurements.    - Replete electrolytes as indicated.    ***ID***  - daptomycin and cefepime started for perioperative transplant coverage  - PO Vanco for c.diff prophylaxis   - Immunosuppression - received solumedrol, simulect.    ***Endocrine***  - Stress Hyperglycemia  - Insulin as needed to maintain euglycemia.      ***Hematology***  - Acute blood loss anemia.  - No current transfusion indication    - At risk for Bleeding              Patient requires continuous monitoring with bedside rhythm monitoring, pulse oximetry monitoring, and continuous central venous and arterial pressure monitoring; and intermittent blood gas analysis. Care plan discussed with the ICU care team.   Patient remained critical, at risk for life threatening decompensation.    I have spent 30 minutes providing critical care management to this patient.    By signing my name below, I, Robert Loerapal, attest that this documentation has been prepared under the direction and in the presence of Tasha Shoemaker DO  Electronically signed: Robert Ady, 12-26-23 @ 06:51    I, Tasha Shoemaker DO, personally performed the services described in this documentation. all medical record entries made by the scribe were at my direction and in my presence. I have reviewed the chart and agree that the record reflects my personal performance and is accurate and complete  Electronically signed: Tasha Shoemaker DO, Patient seen and examined at the bedside.    Remained critically ill on continuous ICU monitoring.      Brief Summary:  57yo M w/ PMHx HTN, CAD s/p stent (2009), ICH (2008), ICM now s/p OHT with IABP on 12/25/2023.      24 Hour events:         OBJECTIVE:  Vital Signs Last 24 Hrs  T(C): 36.7 (26 Dec 2023 04:00), Max: 37.1 (25 Dec 2023 08:45)  T(F): 98.1 (26 Dec 2023 04:00), Max: 98.8 (25 Dec 2023 08:45)  HR: 88 (26 Dec 2023 06:45) (80 - 90)  BP: --  BP(mean): --  RR: 17 (26 Dec 2023 06:45) (7 - 25)  SpO2: 98% (26 Dec 2023 06:45) (95% - 100%)    Parameters below as of 26 Dec 2023 06:08    O2 Flow (L/min): 50  O2 Concentration (%): 50    Mode: CPAP with PS  FiO2: 40  PEEP: 5  PS: 5  MAP: 8  PC:   PIP: 15              Physical Exam:   General: Sedated            Neurology: intact, follows commands off sedation  Respiratory: Extubated to high flow  CV: A-Paced @90  Abdominal: Soft, ND  : +Llamas  Extremities: warm, well perfused, moving all extremities   Skin: No Rashes, Hematoma, Ecchymosis              -------------------------------------------------------------------------------------------------------------------------------    Labs:                        8.8    11.11 )-----------( 198      ( 26 Dec 2023 00:32 )             26.6     12-26    140  |  105  |  26<H>  ----------------------------<  117<H>  3.7   |  20<L>  |  1.41<H>    Ca    10.3      26 Dec 2023 00:31  Phos  3.1     12-26  Mg     2.3     12-26    TPro  6.6  /  Alb  4.3  /  TBili  0.4  /  DBili  x   /  AST  72<H>  /  ALT  33  /  AlkPhos  44  12-26    LIVER FUNCTIONS - ( 26 Dec 2023 00:31 )  Alb: 4.3 g/dL / Pro: 6.6 g/dL / ALK PHOS: 44 U/L / ALT: 33 U/L / AST: 72 U/L / GGT: x           PT/INR - ( 26 Dec 2023 00:33 )   PT: 13.3 sec;   INR: 1.22 ratio         PTT - ( 26 Dec 2023 00:33 )  PTT:27.1 sec  ABG - ( 26 Dec 2023 06:30 )  pH, Arterial: 7.36  pH, Blood: x     /  pCO2: 38    /  pO2: 150   / HCO3: 22    / Base Excess: -3.6  /  SaO2: 99.5              ------------------------------------------------------------------------------------------------------------------------------  Assessment:  57yo M w/ PMHx HTN, CAD s/p stent (2009), ICH (2008), ICM now s/p OHT with IABP on 12/25/2023.    S/p OHT on 12/25/2023  At risk for hemodynamic instability and cardiac arrhythmias  Post op respiratory insufficiency  Receiving inotropic medication  Acute blood loss anemia  Stress hyperglycemia        Plan:   ***Neuro***  - Sedated with Precedex gtt. Wean sedation as tolerated.  - Gabapentin, and PRNs for pain  - Tramadol q8      ***Cardiovascular***  - At risk for hemodynamic instability and cardiac arrhythmias.  - IVF for perioperative hypovolemia.  - IABP @ 1:1  - Continue Milrinone and Epinephrine for inotropic support.  - Cardene for Afterload reduction        ***Pulmonary***.  - Acute respiratory insufficiency  - On high flow nasal cannula. Wean oxygen as able.  - Deep breathing and coughing exercises.  - Sabrina at 10ppm.    ***GI***  - NPO for now.    - Protonix for stress ulcer prophylaxis   - Bowel regimen.  - Reglan for gut motility     ***Renal***  - Llamas catheter for strict I/O measurements.    - Replete electrolytes as indicated.    ***ID***  - daptomycin and cefepime started for perioperative transplant coverage  - PO Vanco for c.diff prophylaxis   - Immunosuppression - received solumedrol, simulect.    ***Endocrine***  - Stress Hyperglycemia  - Insulin as needed to maintain euglycemia.      ***Hematology***  - Acute blood loss anemia.  - No current transfusion indication    - At risk for Bleeding              Patient requires continuous monitoring with bedside rhythm monitoring, pulse oximetry monitoring, and continuous central venous and arterial pressure monitoring; and intermittent blood gas analysis. Care plan discussed with the ICU care team.   Patient remained critical, at risk for life threatening decompensation.    I have spent 30 minutes providing critical care management to this patient.    By signing my name below, I, Robert Loerapal, attest that this documentation has been prepared under the direction and in the presence of Tasha Shoemaker DO  Electronically signed: Robert Ady, 12-26-23 @ 06:51    I, Tasha Shoemaker DO, personally performed the services described in this documentation. all medical record entries made by the scribe were at my direction and in my presence. I have reviewed the chart and agree that the record reflects my personal performance and is accurate and complete  Electronically signed: Tasha Shoemaker DO, Patient seen and examined at the bedside.    Remained critically ill on continuous ICU monitoring.      Brief Summary:  57yo M w/ PMHx HTN, CAD s/p stent (2009), ICH (2008), ICM now s/p OHT with IABP on 12/25/2023.      24 Hour events:  - No acute overnight events  - Extubated this AM to high flow   - Weaning off Precedex when able  - Plan for IABP removal  - Nitric to be weaned off today  - Weaning off Epi  - Tacro started this AM  - Will start SQ Heparin after IABP removal    OBJECTIVE:  Vital Signs Last 24 Hrs  T(C): 36.7 (26 Dec 2023 04:00), Max: 37.1 (25 Dec 2023 08:45)  T(F): 98.1 (26 Dec 2023 04:00), Max: 98.8 (25 Dec 2023 08:45)  HR: 88 (26 Dec 2023 06:45) (80 - 90)  BP: --  BP(mean): --  RR: 17 (26 Dec 2023 06:45) (7 - 25)  SpO2: 98% (26 Dec 2023 06:45) (95% - 100%)    Parameters below as of 26 Dec 2023 06:08    O2 Flow (L/min): 50  O2 Concentration (%): 50    Mode: CPAP with PS  FiO2: 40  PEEP: 5  PS: 5  MAP: 8  PC:   PIP: 15              Physical Exam:   General: Sedated            Neurology: intact, follows commands off sedation  Respiratory: Extubated to high flow  CV: A-Paced @90  Abdominal: Soft, ND  : +Llamas  Extremities: warm, well perfused, moving all extremities   Skin: No Rashes, Hematoma, Ecchymosis              -------------------------------------------------------------------------------------------------------------------------------    Labs:                        8.8    11.11 )-----------( 198      ( 26 Dec 2023 00:32 )             26.6     12-26    140  |  105  |  26<H>  ----------------------------<  117<H>  3.7   |  20<L>  |  1.41<H>    Ca    10.3      26 Dec 2023 00:31  Phos  3.1     12-26  Mg     2.3     12-26    TPro  6.6  /  Alb  4.3  /  TBili  0.4  /  DBili  x   /  AST  72<H>  /  ALT  33  /  AlkPhos  44  12-26    LIVER FUNCTIONS - ( 26 Dec 2023 00:31 )  Alb: 4.3 g/dL / Pro: 6.6 g/dL / ALK PHOS: 44 U/L / ALT: 33 U/L / AST: 72 U/L / GGT: x           PT/INR - ( 26 Dec 2023 00:33 )   PT: 13.3 sec;   INR: 1.22 ratio         PTT - ( 26 Dec 2023 00:33 )  PTT:27.1 sec  ABG - ( 26 Dec 2023 06:30 )  pH, Arterial: 7.36  pH, Blood: x     /  pCO2: 38    /  pO2: 150   / HCO3: 22    / Base Excess: -3.6  /  SaO2: 99.5              ------------------------------------------------------------------------------------------------------------------------------  Assessment:  57yo M w/ PMHx HTN, CAD s/p stent (2009), ICH (2008), ICM now s/p OHT with IABP on 12/25/2023.    S/p OHT on 12/25/2023  At risk for hemodynamic instability and cardiac arrhythmias  Post op respiratory insufficiency  Receiving inotropic medication  Acute blood loss anemia  Stress hyperglycemia        Plan:   ***Neuro***  - Sedated with Precedex gtt. Wean sedation to off as tolerated.  - Gabapentin, and PRNs for pain  - Tramadol q8      ***Cardiovascular***  - At risk for hemodynamic instability and cardiac arrhythmias.  - IVF for perioperative hypovolemia.  - IABP @ 1:1-1:2. Plan for removal   - Continue Milrinone and Epinephrine for inotropic support. Wean off Epinephrine today  - Cardene for Afterload reduction   - Will start SQ Heparin after IABP removal       ***Pulmonary***.  - Acute respiratory insufficiency  - On high flow nasal cannula. Wean oxygen as able.  - Deep breathing and coughing exercises.  - Sabrina at 10ppm. Plan to wean off today     ***GI***  - NPO for now.    - Protonix for stress ulcer prophylaxis   - Bowel regimen.  - Reglan for gut motility     ***Renal***  - Llamas catheter for strict I/O measurements.    - Replete electrolytes as indicated.    ***ID***  - daptomycin and cefepime started for perioperative transplant coverage  - PO Vanco for c.diff prophylaxis   - Immunosuppression - received solumedrol, simulect. Tacro started this AM    ***Endocrine***  - Stress Hyperglycemia  - Insulin as needed to maintain euglycemia.      ***Hematology***  - Acute blood loss anemia.  - No current transfusion indication    - At risk for Bleeding              Patient requires continuous monitoring with bedside rhythm monitoring, pulse oximetry monitoring, and continuous central venous and arterial pressure monitoring; and intermittent blood gas analysis. Care plan discussed with the ICU care team.   Patient remained critical, at risk for life threatening decompensation.    I have spent 30 minutes providing critical care management to this patient.    By signing my name below, I, Robertmannie Garsia, attest that this documentation has been prepared under the direction and in the presence of Tasha Shoemaker DO  Electronically signed: Robert Garsia, 12-26-23 @ 06:51    I, Tasha Shoemaker DO, personally performed the services described in this documentation. all medical record entries made by the scribe were at my direction and in my presence. I have reviewed the chart and agree that the record reflects my personal performance and is accurate and complete  Electronically signed: Tasha Shoemaker DO, Patient seen and examined at the bedside.    Remained critically ill on continuous ICU monitoring.      Brief Summary:  59yo M w/ PMHx HTN, CAD s/p stent (2009), ICH (2008), ICM now s/p OHT with IABP on 12/25/2023.      24 Hour events:  - No acute overnight events  - Extubated this AM to high flow   - Sabrina weaned to 10 ppm.    OBJECTIVE:  Vital Signs Last 24 Hrs  T(C): 36.7 (26 Dec 2023 04:00), Max: 37.1 (25 Dec 2023 08:45)  T(F): 98.1 (26 Dec 2023 04:00), Max: 98.8 (25 Dec 2023 08:45)  HR: 88 (26 Dec 2023 06:45) (80 - 90)  BP: --  BP(mean): --  RR: 17 (26 Dec 2023 06:45) (7 - 25)  SpO2: 98% (26 Dec 2023 06:45) (95% - 100%)    Parameters below as of 26 Dec 2023 06:08    O2 Flow (L/min): 50  O2 Concentration (%): 50    Mode: CPAP with PS  FiO2: 40  PEEP: 5  PS: 5  MAP: 8  PC:   PIP: 15              Physical Exam:   General: awake, interactive  Neurology: intact,   Respiratory: on HFNC, non-labored respirations  CV: Paced @ 90  Abdominal: Soft, ND  : +Llamas  Extremities: warm, well perfused, moving all extremities   Skin: No Rashes, Hematoma, Ecchymosis              -------------------------------------------------------------------------------------------------------------------------------    Labs:                        8.8    11.11 )-----------( 198      ( 26 Dec 2023 00:32 )             26.6     12-26    140  |  105  |  26<H>  ----------------------------<  117<H>  3.7   |  20<L>  |  1.41<H>    Ca    10.3      26 Dec 2023 00:31  Phos  3.1     12-26  Mg     2.3     12-26    TPro  6.6  /  Alb  4.3  /  TBili  0.4  /  DBili  x   /  AST  72<H>  /  ALT  33  /  AlkPhos  44  12-26    LIVER FUNCTIONS - ( 26 Dec 2023 00:31 )  Alb: 4.3 g/dL / Pro: 6.6 g/dL / ALK PHOS: 44 U/L / ALT: 33 U/L / AST: 72 U/L / GGT: x           PT/INR - ( 26 Dec 2023 00:33 )   PT: 13.3 sec;   INR: 1.22 ratio         PTT - ( 26 Dec 2023 00:33 )  PTT:27.1 sec  ABG - ( 26 Dec 2023 06:30 )  pH, Arterial: 7.36  pH, Blood: x     /  pCO2: 38    /  pO2: 150   / HCO3: 22    / Base Excess: -3.6  /  SaO2: 99.5              ------------------------------------------------------------------------------------------------------------------------------  Assessment:  59yo M w/ PMHx HTN, CAD s/p stent (2009), ICH (2008), ICM now s/p OHT with IABP on 12/25/2023.    S/p OHT on 12/25/2023  At risk for hemodynamic instability and cardiac arrhythmias  Post op respiratory insufficiency  Receiving inotropic medication  Acute blood loss anemia  Stress hyperglycemia      Plan:   ***Neuro***  - Tylenol, gabapentin, tramadol, and PRNs for pain  - Optimize day/night cycles.    ***Cardiovascular***  - At risk for hemodynamic instability and cardiac arrhythmias.  - Continue Milrinone for inotropic support. Wean off Epinephrine today  - Cardene for afterload reduction.  - D/c IABP.    ***Pulmonary***.  - Acute respiratory insufficiency  - On high flow nasal cannula. Wean oxygen as able.  - Deep breathing and coughing exercises.  - Sabrina at 10ppm. Plan to wean off today.    ***GI***  - NGT to decompress abdomen.  - Protonix for stress ulcer prophylaxis   - Bowel regimen.  - Reglan for gut motility     ***Renal***  - Llamas catheter for strict I/O measurements.    - Replete electrolytes as indicated.    ***ID***  - Daptomycin and cefepime started for perioperative transplant coverage  - PO Vanco for c.diff prophylaxis  - Immunosuppression - tacro, cellcept, steroids.    ***Endocrine***  - Stress Hyperglycemia  - Insulin as needed to maintain euglycemia.      ***Hematology***  - Acute blood loss anemia.  - No current transfusion indication            Patient requires continuous monitoring with bedside rhythm monitoring, pulse oximetry monitoring, and continuous central venous and arterial pressure monitoring; and intermittent blood gas analysis. Care plan discussed with the ICU care team.   Patient remained critical, at risk for life threatening decompensation.    I have spent 60 minutes providing critical care management to this patient.    By signing my name below, I, Robert Ady, attest that this documentation has been prepared under the direction and in the presence of Tasha Shoemaker DO  Electronically signed: Robert Ady, 12-26-23 @ 06:51    I, Tasha Shoemaker DO, personally performed the services described in this documentation. all medical record entries made by the scribe were at my direction and in my presence. I have reviewed the chart and agree that the record reflects my personal performance and is accurate and complete  Electronically signed: Tasha Shoemaker DO, 98.9

## 2024-01-31 NOTE — OCCUPATIONAL THERAPY INITIAL EVALUATION ADULT - ADAPTIVE EQUIPMENT NEEDED
Plan:  1. Medication changes:  Stop taking aspirin  - Vit d3 1000 units daily  - written prescription for toprol: will take to other pharmacy   -     2. Testing:  - Echo 2 months   - Calcium score     3. Labs:  - 6 month Labs     4. Follow up:  - 6 months fup.   Continue to follow these guidelines unless otherwise instructed by your doctor:    2000mg sodium diet with NO added salt to food  Activity as instructed by your doctor:  Drink 64 ounces of fluid per day, total  Weigh yourself daily at the same time each day and keep a record of it  Report weight gain of 3 lbs in 1 day or 5 lbs or more in 1 week    CONTINUE TO TAKE YOUR MEDICATIONS AS PRESCRIBED   BRING YOUR MEDICATION LIST TO EACH VISIT    Please call the Heart Failure Office at [phone] if you are unable to keep your appointment or if you have any questions or concerns.     
no

## 2024-03-20 NOTE — ED ADULT TRIAGE NOTE - INTERNATIONAL TRAVEL
Spoke with pharmacy who stated there was nothing need from pcp office but [facility] does not have the medication within 50 miles of them. Medication is on back order and nothing further is needed.    No

## 2024-03-23 ENCOUNTER — EMERGENCY (EMERGENCY)
Facility: HOSPITAL | Age: 74
LOS: 1 days | Discharge: HOME | End: 2024-03-23
Attending: EMERGENCY MEDICINE
Payer: MEDICARE

## 2024-03-23 VITALS
TEMPERATURE: 98 F | WEIGHT: 199.96 LBS | RESPIRATION RATE: 20 BRPM | SYSTOLIC BLOOD PRESSURE: 166 MMHG | DIASTOLIC BLOOD PRESSURE: 77 MMHG | OXYGEN SATURATION: 98 % | HEIGHT: 66 IN | HEART RATE: 63 BPM

## 2024-03-23 VITALS
HEART RATE: 60 BPM | SYSTOLIC BLOOD PRESSURE: 160 MMHG | OXYGEN SATURATION: 98 % | DIASTOLIC BLOOD PRESSURE: 77 MMHG | TEMPERATURE: 99 F | RESPIRATION RATE: 20 BRPM

## 2024-03-23 PROCEDURE — 99284 EMERGENCY DEPT VISIT MOD MDM: CPT | Mod: GC

## 2024-03-23 PROCEDURE — 99284 EMERGENCY DEPT VISIT MOD MDM: CPT | Mod: 25

## 2024-03-23 PROCEDURE — 70486 CT MAXILLOFACIAL W/O DYE: CPT | Mod: 26,MC

## 2024-03-23 PROCEDURE — 70486 CT MAXILLOFACIAL W/O DYE: CPT | Mod: MC

## 2024-03-23 PROCEDURE — 70450 CT HEAD/BRAIN W/O DYE: CPT | Mod: MC

## 2024-03-23 PROCEDURE — 70450 CT HEAD/BRAIN W/O DYE: CPT | Mod: 26,MC

## 2024-03-23 RX ORDER — ACETAMINOPHEN 500 MG
975 TABLET ORAL ONCE
Refills: 0 | Status: COMPLETED | OUTPATIENT
Start: 2024-03-23 | End: 2024-03-23

## 2024-03-23 RX ORDER — OXYCODONE HYDROCHLORIDE 5 MG/1
2.5 TABLET ORAL ONCE
Refills: 0 | Status: DISCONTINUED | OUTPATIENT
Start: 2024-03-23 | End: 2024-03-23

## 2024-03-23 RX ADMIN — Medication 975 MILLIGRAM(S): at 17:38

## 2024-03-23 RX ADMIN — OXYCODONE HYDROCHLORIDE 2.5 MILLIGRAM(S): 5 TABLET ORAL at 17:38

## 2024-03-23 RX ADMIN — OXYCODONE HYDROCHLORIDE 2.5 MILLIGRAM(S): 5 TABLET ORAL at 18:15

## 2024-03-23 RX ADMIN — Medication 975 MILLIGRAM(S): at 18:15

## 2024-03-23 NOTE — ED ADULT NURSE NOTE - OBJECTIVE STATEMENT
Pt to ED from home c/o continued L head pressure and L eye pain and tenderness x1 wk s/p being hit in the L eye/face by her dog as he was running to the door. Pt also c/o lightheadedness at times. Pt denies LOC, n/v, dizziness, weakness, vision changes. Pt takes plavix. Pt noted to have bruising to the L eye. Pt A&Ox4 in nAD Pt to ED from home c/o continued L head pressure and L eye pain and tenderness x1 wk s/p being hit in the L eye/face by her dog as he was running to the door. Pt also c/o lightheadedness at times. Pt denies LOC, n/v, dizziness, weakness, vision changes. Pt takes Plavix. Pt noted to have bruising to the L eye. Pt A&Ox4 in NAD @ this time. RR even & unlabored. Hx HTN, TIAs, RA.

## 2024-03-23 NOTE — ED PROVIDER NOTE - PATIENT PORTAL LINK FT
You can access the FollowMyHealth Patient Portal offered by [facility] by registering at the following website: [email or web address]. By joining [facility]’s FollowMyHealth portal, you will also be able to view your health information using other applications (apps) compatible with our system.

## 2024-03-23 NOTE — ED ADULT NURSE NOTE - NSFALLRISKINTERV_ED_ALL_ED

## 2024-03-23 NOTE — ED PROVIDER NOTE - ATTENDING CONTRIBUTION TO CARE
The patient seen with resident    Closed Head Injury    I, Dariel Yee, performed the initial face to face bedside interview with this patient regarding history of present illness, review of symptoms and relevant past medical, social and family history.  I completed an independent physical examination.  I was the initial provider who evaluated this patient. I have signed out the follow up of any pending tests (i.e. labs, radiological studies) to the resident.  I have communicated the patient’s plan of care and disposition with the resident

## 2024-03-23 NOTE — ED PROVIDER NOTE - CLINICAL SUMMARY MEDICAL DECISION MAKING FREE TEXT BOX
Pt is a 73 yo female with PMH of TIAs x 5, RA, chronic back pain, HTN presenting with headache. Pt reports that she was hit in the L periorbital and L head region by a running dog. Pt had swelling in L eye and on scalp that has improved but is persistent. Pt has left head pain that is mildly improved with excedrin. Pt denies fever, chills, vision problems, LOC, Nvd.  EOM intact, no entrapment sign, no visual changes on exam, no LOC.    CT head and maxillofacial, tylenol for pain.

## 2024-03-23 NOTE — ED PROVIDER NOTE - OBJECTIVE STATEMENT
Pt is a 75 yo female with PMH of TIAs x 5, RA, chronic back pain, HTN presenting with headache. Pt reports that she was hit in the L periorbital and L head region by a running dog. Pt had swelling in L eye and on scalp that has improved but is persistent. Pt has left head pain that is mildly improved with excedrin. Pt denies fever, chills, vision problems, LOC, Nvd.

## 2024-03-23 NOTE — ED ADULT TRIAGE NOTE - CHIEF COMPLAINT QUOTE
Pt  send from [facility] to be eval for consistent unilateral HA after being hit by a dog a week ago - on Plavix . Ha not relieved with OT medications

## 2024-03-23 NOTE — ED PROVIDER NOTE - EYES, MLM
Clear bilaterally, pupils equal, round and reactive to light. L periorbital region has ecchymosis, swelling and mild tenderness

## 2024-03-23 NOTE — ED ADULT NURSE REASSESSMENT NOTE - NS ED NURSE REASSESS COMMENT FT1
Assumed care of patient from [facility]  and patient lying on stretcher  quietly, patient updated on wait and states understanding, call bell at reach.

## 2024-11-22 ENCOUNTER — EMERGENCY (EMERGENCY)
Facility: HOSPITAL | Age: 74
LOS: 1 days | Discharge: HOME | End: 2024-11-22
Attending: EMERGENCY MEDICINE
Payer: MEDICARE

## 2024-11-22 VITALS
TEMPERATURE: 98 F | OXYGEN SATURATION: 100 % | SYSTOLIC BLOOD PRESSURE: 119 MMHG | WEIGHT: 195.11 LBS | RESPIRATION RATE: 18 BRPM | DIASTOLIC BLOOD PRESSURE: 75 MMHG | HEART RATE: 67 BPM

## 2024-11-22 VITALS
HEART RATE: 76 BPM | DIASTOLIC BLOOD PRESSURE: 67 MMHG | OXYGEN SATURATION: 96 % | RESPIRATION RATE: 20 BRPM | SYSTOLIC BLOOD PRESSURE: 114 MMHG | TEMPERATURE: 98 F

## 2024-11-22 LAB
ALBUMIN SERPL ELPH-MCNC: 3.8 G/DL — SIGNIFICANT CHANGE UP (ref 3.3–5.2)
ALP SERPL-CCNC: 62 U/L — SIGNIFICANT CHANGE UP (ref 40–120)
ALT FLD-CCNC: 21 U/L — SIGNIFICANT CHANGE UP
ANION GAP SERPL CALC-SCNC: 9 MMOL/L — SIGNIFICANT CHANGE UP (ref 5–17)
APPEARANCE UR: CLEAR — SIGNIFICANT CHANGE UP
AST SERPL-CCNC: 28 U/L — SIGNIFICANT CHANGE UP
BILIRUB SERPL-MCNC: 0.3 MG/DL — LOW (ref 0.4–2)
BILIRUB UR-MCNC: NEGATIVE — SIGNIFICANT CHANGE UP
BUN SERPL-MCNC: 18.7 MG/DL — SIGNIFICANT CHANGE UP (ref 8–20)
CALCIUM SERPL-MCNC: 10.2 MG/DL — SIGNIFICANT CHANGE UP (ref 8.4–10.5)
CHLORIDE SERPL-SCNC: 103 MMOL/L — SIGNIFICANT CHANGE UP (ref 96–108)
CO2 SERPL-SCNC: 30 MMOL/L — HIGH (ref 22–29)
COLOR SPEC: SIGNIFICANT CHANGE UP
CREAT SERPL-MCNC: 0.73 MG/DL — SIGNIFICANT CHANGE UP (ref 0.5–1.3)
DIFF PNL FLD: ABNORMAL
EGFR: 86 ML/MIN/1.73M2 — SIGNIFICANT CHANGE UP
FLUAV AG NPH QL: SIGNIFICANT CHANGE UP
FLUBV AG NPH QL: SIGNIFICANT CHANGE UP
GLUCOSE SERPL-MCNC: 110 MG/DL — HIGH (ref 70–99)
GLUCOSE UR QL: NEGATIVE MG/DL — SIGNIFICANT CHANGE UP
HCT VFR BLD CALC: 35.6 % — SIGNIFICANT CHANGE UP (ref 34.5–45)
HGB BLD-MCNC: 11.9 G/DL — SIGNIFICANT CHANGE UP (ref 11.5–15.5)
KETONES UR-MCNC: NEGATIVE MG/DL — SIGNIFICANT CHANGE UP
LEUKOCYTE ESTERASE UR-ACNC: ABNORMAL
MAGNESIUM SERPL-MCNC: 2.2 MG/DL — SIGNIFICANT CHANGE UP (ref 1.6–2.6)
MCHC RBC-ENTMCNC: 29.4 PG — SIGNIFICANT CHANGE UP (ref 27–34)
MCHC RBC-ENTMCNC: 33.4 G/DL — SIGNIFICANT CHANGE UP (ref 32–36)
MCV RBC AUTO: 87.9 FL — SIGNIFICANT CHANGE UP (ref 80–100)
NITRITE UR-MCNC: NEGATIVE — SIGNIFICANT CHANGE UP
PH UR: 7.5 — SIGNIFICANT CHANGE UP (ref 5–8)
PHOSPHATE SERPL-MCNC: 3 MG/DL — SIGNIFICANT CHANGE UP (ref 2.4–4.7)
PLATELET # BLD AUTO: 186 K/UL — SIGNIFICANT CHANGE UP (ref 150–400)
POTASSIUM SERPL-MCNC: 4.4 MMOL/L — SIGNIFICANT CHANGE UP (ref 3.5–5.3)
POTASSIUM SERPL-SCNC: 4.4 MMOL/L — SIGNIFICANT CHANGE UP (ref 3.5–5.3)
PROT SERPL-MCNC: 6.1 G/DL — LOW (ref 6.6–8.7)
PROT UR-MCNC: NEGATIVE MG/DL — SIGNIFICANT CHANGE UP
RBC # BLD: 4.05 M/UL — SIGNIFICANT CHANGE UP (ref 3.8–5.2)
RBC # FLD: 12.2 % — SIGNIFICANT CHANGE UP (ref 10.3–14.5)
RSV RNA NPH QL NAA+NON-PROBE: SIGNIFICANT CHANGE UP
SARS-COV-2 RNA SPEC QL NAA+PROBE: SIGNIFICANT CHANGE UP
SODIUM SERPL-SCNC: 142 MMOL/L — SIGNIFICANT CHANGE UP (ref 135–145)
SP GR SPEC: 1.02 — SIGNIFICANT CHANGE UP (ref 1–1.03)
TSH SERPL-MCNC: 0.95 UIU/ML — SIGNIFICANT CHANGE UP (ref 0.27–4.2)
UROBILINOGEN FLD QL: 1 MG/DL — SIGNIFICANT CHANGE UP (ref 0.2–1)
WBC # BLD: 4.3 K/UL — SIGNIFICANT CHANGE UP (ref 3.8–10.5)
WBC # FLD AUTO: 4.3 K/UL — SIGNIFICANT CHANGE UP (ref 3.8–10.5)

## 2024-11-22 PROCEDURE — 84443 ASSAY THYROID STIM HORMONE: CPT

## 2024-11-22 PROCEDURE — 84100 ASSAY OF PHOSPHORUS: CPT

## 2024-11-22 PROCEDURE — 85027 COMPLETE CBC AUTOMATED: CPT

## 2024-11-22 PROCEDURE — 87637 SARSCOV2&INF A&B&RSV AMP PRB: CPT

## 2024-11-22 PROCEDURE — 99285 EMERGENCY DEPT VISIT HI MDM: CPT | Mod: GC

## 2024-11-22 PROCEDURE — 87086 URINE CULTURE/COLONY COUNT: CPT

## 2024-11-22 PROCEDURE — 36415 COLL VENOUS BLD VENIPUNCTURE: CPT

## 2024-11-22 PROCEDURE — 70547 MR ANGIOGRAPHY NECK W/O DYE: CPT

## 2024-11-22 PROCEDURE — 96374 THER/PROPH/DIAG INJ IV PUSH: CPT

## 2024-11-22 PROCEDURE — 83735 ASSAY OF MAGNESIUM: CPT

## 2024-11-22 PROCEDURE — 99284 EMERGENCY DEPT VISIT MOD MDM: CPT | Mod: 25

## 2024-11-22 PROCEDURE — 80053 COMPREHEN METABOLIC PANEL: CPT

## 2024-11-22 PROCEDURE — 70544 MR ANGIOGRAPHY HEAD W/O DYE: CPT | Mod: 26,MC,XU

## 2024-11-22 PROCEDURE — 81001 URINALYSIS AUTO W/SCOPE: CPT

## 2024-11-22 PROCEDURE — 70551 MRI BRAIN STEM W/O DYE: CPT | Mod: 26,MC

## 2024-11-22 PROCEDURE — 70547 MR ANGIOGRAPHY NECK W/O DYE: CPT | Mod: 26,MH

## 2024-11-22 PROCEDURE — 70551 MRI BRAIN STEM W/O DYE: CPT | Mod: MC

## 2024-11-22 PROCEDURE — 70544 MR ANGIOGRAPHY HEAD W/O DYE: CPT | Mod: MC

## 2024-11-22 RX ORDER — OXYCODONE HYDROCHLORIDE 30 MG/1
10 TABLET ORAL ONCE
Refills: 0 | Status: DISCONTINUED | OUTPATIENT
Start: 2024-11-22 | End: 2024-11-22

## 2024-11-22 RX ORDER — LORAZEPAM 2 MG
1 TABLET ORAL ONCE
Refills: 0 | Status: DISCONTINUED | OUTPATIENT
Start: 2024-11-22 | End: 2024-11-22

## 2024-11-22 RX ADMIN — Medication 1 MILLIGRAM(S): at 18:19

## 2024-11-22 RX ADMIN — OXYCODONE HYDROCHLORIDE 10 MILLIGRAM(S): 30 TABLET ORAL at 15:06

## 2024-11-22 NOTE — ED PROVIDER NOTE - PHYSICAL EXAMINATION
GENERAL: no acute distress, comfortably in bed  HEAD: NC/AT, no facial asymmetry noted, sensation intact bilaterally   EYES: PERRLA, EOMI, Non-icteric, no nystagmus  ENT: Mucous membranes moist, neck supple  NEURO: No focal deficits, moving all extremities spontaneously, A&Ox3, no dysarthria, mild + romberg, bilateral twitching with hands placed out in front of her and eyes closed  PSYCH: Normal affect, calm, appropriate insight and judgment, fluent speech  RESP: CTAB, non-labored breathing  CVS: RRR  GI: Soft, non-tender, non-distended  : No gagnon, no suprapubic tenderness  EXTREMITIES: Left LE edema with chronic changes - chronic for patient, RLE with limited ROM due to Right hip replacement, 5/5 strength in bilateral upper and lower extremities  SKIN: chronic skin changes

## 2024-11-22 NOTE — ED ADULT NURSE NOTE - MODE OF DISCHARGE
Ambulatory Finasteride Male Counseling: Finasteride Counseling:  I discussed with the patient the risks of use of finasteride including but not limited to decreased libido, decreased ejaculate volume, gynecomastia, and depression. Women should not handle medication.  All of the patient's questions and concerns were addressed. Finasteride Counseling:  I discussed with the patient the risks of use of finasteride including but not limited to decreased libido, decreased ejaculate volume, gynecomastia, and depression. Women should not handle medication.  All of the patient's questions and concerns were addressed.

## 2024-11-22 NOTE — ED PROVIDER NOTE - PROGRESS NOTE DETAILS
Reassessed patient. States that her bilateral jerking is still present, but has improved. Continues to feel weak, but improved overall. PT evaluation was offered, but patient refused and would like to go home. Patient's brother is going to pick patient up. progress note (md georgina): patient eloped prior to discharge.

## 2024-11-22 NOTE — ED PROVIDER NOTE - OBJECTIVE STATEMENT
Patient is a 74y F with PMHx of HTN, RA, TIAs x5 (last in 2023, on Plavix), and MGUS who presented to [facility] ED for bilateral R > L hand shaking and weakness. Patient states that at 10:30AM she was trying to text her daughter when she lost  on her phone and started twitching. Denies n/t. Does endorse slightly difficulty with getting her words out, but no dysphagia. Patient reports that she took K+ and Mg+ supplement packets and has had improvement in her symptoms. Patient also endorses unilateral Left sided HA. Denies nausea, vomiting, CP, SOB, dysuria, hematuria. Patient is a 74y F with PMHx of HTN, RA, TIAs x5 (last in 2023, on Plavix), and MGUS who presented to [facility] ED for bilateral R > L hand shaking and weakness. Patient states that at 10:30AM she was trying to text her daughter when she lost  on her phone and started twitching. Denies n/t. Does endorse slightly difficulty with getting her words out, but no dysphagia. Patient reports that she took K+ and Mg+ supplement packets and has had improvement in her symptoms. Patient also endorses unilateral Left sided HA that is sharp in quality. Patient states that she has felt similar shaking in the past during her prior TIAs and reports that she does get HA that improve with Excedrin. Denies changes in vision, hearing, nausea, vomiting, CP, SOB, dysuria, hematuria.

## 2024-11-22 NOTE — ED ADULT NURSE REASSESSMENT NOTE - NS ED NURSE REASSESS COMMENT FT1
Assumed care of patient. patient is alert and orientated x4. respirations are even and non-labored. IV site d/c/i. patient pending MRI results. meal provided.

## 2024-11-22 NOTE — ED ADULT TRIAGE NOTE - CHIEF COMPLAINT QUOTE
Pt BIBA c/o feeling shaky bilaterally and generalized weakness.  Pt has hx of TIA but states symptoms in the past have been one sided.  Pt is a&ox3.  Equal strength, sensation bilaterally.  As per EMS pt ambulated to ambulance without issue.  No neuro deficits noted. Denies cp, sob, hx of DM.

## 2024-11-22 NOTE — ED ADULT NURSE NOTE - OBJECTIVE STATEMENT
Pt c/o bilat trembling. Onset today.  Pt reports bilat shoulder discomfort from prior shoulder surgeries.  Pt has hx of TIA.  Pt has equal strength, sensation bilaterally.  Denies numbness, tingling.  No neuro deficits noted.

## 2024-11-22 NOTE — ED ADULT NURSE NOTE - NSFALLUNIVINTERV_ED_ALL_ED
Bed/Stretcher in lowest position, wheels locked, appropriate side rails in place/Call bell, personal items and telephone in reach/Instruct patient to call for assistance before getting out of bed/chair/stretcher/Non-slip footwear applied when patient is off stretcher/[place] to call system/Physically safe environment - no spills, clutter or unnecessary equipment/Purposeful proactive rounding/Room/bathroom lighting operational, light cord in reach

## 2024-11-22 NOTE — ED PROVIDER NOTE - CARE PROVIDER_API CALL
Rajan Townsend  Neurology  [address] [place] [address], [facility] [address]  [place] [address]  Phone: [phone]  Fax: [phone]  Follow Up Time: 1-3 Days

## 2024-11-22 NOTE — ED PROVIDER NOTE - CLINICAL SUMMARY MEDICAL DECISION MAKING FREE TEXT BOX
Patient is a 74y F with PMHx of HTN, RA, TIAs x5 (last in 2023, on Plavix), and MGUS who presented to [facility] ED for bilateral R > L hand shaking and weakness. MR Brain, MRA and MR Neck ordered for further evaluation of possible TIA. Basic labs, Mag, Phos, and TSH ordered to assess for electrolyte abnormalities. Patient did not take her daily oxycodone 10mg, x1 ordered STAT. Patient is a 74y F with PMHx of HTN, RA, TIAs x5 (last in 2023, on Plavix), and MGUS who presented to [facility] ED for bilateral R > L hand shaking and weakness. MR Brain, MRA and MR Neck ordered for further evaluation of possible TIA. Basic labs, Mag, Phos, and TSH ordered to assess for electrolyte abnormalities. Patient did not take her daily oxycodone 10mg, x1 ordered STAT.    Labs wnl. MR brain, MRA and MR neck - negative for recent infarct, occlusion, stenosis, or dissection. Offered to have PT evaluate patient prior to discharge. Patient refused and would like to return home.

## 2024-11-22 NOTE — ED PROVIDER NOTE - NSICDXPASTMEDICALHX_GEN_ALL_CORE_FT
PAST MEDICAL HISTORY:  History of TIAs     Hypertension     MGUS (monoclonal gammopathy of unknown significance)     Rheumatoid arthritis

## 2024-11-22 NOTE — ED PROVIDER NOTE - ATTENDING CONTRIBUTION TO CARE
74-year-old female with history of hypertension, rheumatoid arthritis and history of multiple TIAs  complaining of waking up this morning with inability to use her left  hand to text her daughter this morning and had increasing tremors left greater than right hand and admits to episode of difficulty getting the words out  which she states lasted approximately 20 minutes.   patient with history of multiple TIAs for which she is currently on Plavix.  Patient follows with Dr. Peter from [place] [facility].   on exam patient awake and alert in no acute distress, PERRL, [place] remains moist, neck supple, heart regular, lungs clear bilaterally, abdomen soft no localized tenderness, extremities positive resting tremor bilateral hands, speech clear, muscle strength equal 5/5 bilaterally upper extremities lower extremities, questionable mild pronator drift right upper extremity.  NIHSS 1.  Will check labs and obtain MRI of brain and neck and consult neurology as needed 74-year-old female with history of hypertension, rheumatoid arthritis and history of multiple TIAs  complaining of waking up this morning with inability to use her left  hand to text her daughter this morning and had increasing tremors left greater than right hand and admits to episode of difficulty getting the words out  which she states lasted approximately 20 minutes.   patient with history of multiple TIAs for which she is currently on Plavix.  Patient follows with Dr. Peter from [place] [facility].   on exam patient awake and alert in no acute distress, PERRL, mm moist, neck supple, heart regular, lungs clear bilaterally, abdomen soft no localized tenderness, extremities positive resting tremor bilateral hands, speech clear, muscle strength equal 5/5 bilaterally upper extremities lower extremities, questionable mild pronator drift right upper extremity.  NIHSS 1.  Will check labs and obtain MRI of brain and neck and consult neurology as needed

## 2024-11-24 LAB
CULTURE RESULTS: SIGNIFICANT CHANGE UP
SPECIMEN SOURCE: SIGNIFICANT CHANGE UP

## 2024-12-08 NOTE — ASU PATIENT PROFILE, ADULT - PAIN RATING AT ACTIVITY

## 2025-01-18 ENCOUNTER — EMERGENCY (EMERGENCY)
Facility: HOSPITAL | Age: 75
LOS: 1 days | Discharge: HOME | End: 2025-01-18
Attending: STUDENT IN AN ORGANIZED HEALTH CARE EDUCATION/TRAINING PROGRAM
Payer: MEDICARE

## 2025-01-18 VITALS
HEART RATE: 73 BPM | DIASTOLIC BLOOD PRESSURE: 55 MMHG | SYSTOLIC BLOOD PRESSURE: 112 MMHG | TEMPERATURE: 98 F | WEIGHT: 188.94 LBS | HEIGHT: 61 IN | RESPIRATION RATE: 20 BRPM | OXYGEN SATURATION: 98 %

## 2025-01-18 VITALS
HEART RATE: 58 BPM | SYSTOLIC BLOOD PRESSURE: 114 MMHG | RESPIRATION RATE: 19 BRPM | OXYGEN SATURATION: 95 % | TEMPERATURE: 99 F | DIASTOLIC BLOOD PRESSURE: 67 MMHG

## 2025-01-18 PROBLEM — D47.2 MONOCLONAL GAMMOPATHY: Chronic | Status: ACTIVE | Noted: 2024-11-22

## 2025-01-18 LAB
ALBUMIN SERPL ELPH-MCNC: 4.3 G/DL — SIGNIFICANT CHANGE UP (ref 3.3–5.2)
ALP SERPL-CCNC: 91 U/L — SIGNIFICANT CHANGE UP (ref 40–120)
ALT FLD-CCNC: 18 U/L — SIGNIFICANT CHANGE UP
ANION GAP SERPL CALC-SCNC: 13 MMOL/L — SIGNIFICANT CHANGE UP (ref 5–17)
APPEARANCE UR: ABNORMAL
APTT BLD: 27.3 SEC — SIGNIFICANT CHANGE UP (ref 24.5–35.6)
AST SERPL-CCNC: 26 U/L — SIGNIFICANT CHANGE UP
BACTERIA # UR AUTO: ABNORMAL /HPF
BASOPHILS # BLD AUTO: 0.02 K/UL — SIGNIFICANT CHANGE UP (ref 0–0.2)
BASOPHILS NFR BLD AUTO: 0.3 % — SIGNIFICANT CHANGE UP (ref 0–2)
BILIRUB SERPL-MCNC: 0.5 MG/DL — SIGNIFICANT CHANGE UP (ref 0.4–2)
BILIRUB UR-MCNC: NEGATIVE — SIGNIFICANT CHANGE UP
BLD GP AB SCN SERPL QL: SIGNIFICANT CHANGE UP
BUN SERPL-MCNC: 19.9 MG/DL — SIGNIFICANT CHANGE UP (ref 8–20)
CALCIUM SERPL-MCNC: 10 MG/DL — SIGNIFICANT CHANGE UP (ref 8.4–10.5)
CAST: 4 /LPF — SIGNIFICANT CHANGE UP (ref 0–4)
CHLORIDE SERPL-SCNC: 105 MMOL/L — SIGNIFICANT CHANGE UP (ref 96–108)
CK SERPL-CCNC: 86 U/L — SIGNIFICANT CHANGE UP (ref 25–170)
CO2 SERPL-SCNC: 25 MMOL/L — SIGNIFICANT CHANGE UP (ref 22–29)
COD CRY URNS QL: PRESENT
COLOR SPEC: SIGNIFICANT CHANGE UP
CREAT SERPL-MCNC: 0.79 MG/DL — SIGNIFICANT CHANGE UP (ref 0.5–1.3)
DIFF PNL FLD: ABNORMAL
EGFR: 78 ML/MIN/1.73M2 — SIGNIFICANT CHANGE UP
EGFR: 78 ML/MIN/1.73M2 — SIGNIFICANT CHANGE UP
EOSINOPHIL # BLD AUTO: 0.08 K/UL — SIGNIFICANT CHANGE UP (ref 0–0.5)
EOSINOPHIL NFR BLD AUTO: 1.3 % — SIGNIFICANT CHANGE UP (ref 0–6)
GLUCOSE SERPL-MCNC: 95 MG/DL — SIGNIFICANT CHANGE UP (ref 70–99)
GLUCOSE UR QL: NEGATIVE MG/DL — SIGNIFICANT CHANGE UP
HCT VFR BLD CALC: 36.4 % — SIGNIFICANT CHANGE UP (ref 34.5–45)
HGB BLD-MCNC: 12.3 G/DL — SIGNIFICANT CHANGE UP (ref 11.5–15.5)
IMM GRANULOCYTES NFR BLD AUTO: 0.2 % — SIGNIFICANT CHANGE UP (ref 0–0.9)
INR BLD: 1.1 RATIO — SIGNIFICANT CHANGE UP (ref 0.85–1.16)
KETONES UR-MCNC: ABNORMAL MG/DL
LEUKOCYTE ESTERASE UR-ACNC: ABNORMAL
LYMPHOCYTES # BLD AUTO: 2.95 K/UL — SIGNIFICANT CHANGE UP (ref 1–3.3)
LYMPHOCYTES # BLD AUTO: 49.3 % — HIGH (ref 13–44)
MCHC RBC-ENTMCNC: 29.9 PG — SIGNIFICANT CHANGE UP (ref 27–34)
MCHC RBC-ENTMCNC: 33.8 G/DL — SIGNIFICANT CHANGE UP (ref 32–36)
MCV RBC AUTO: 88.3 FL — SIGNIFICANT CHANGE UP (ref 80–100)
MONOCYTES # BLD AUTO: 0.42 K/UL — SIGNIFICANT CHANGE UP (ref 0–0.9)
MONOCYTES NFR BLD AUTO: 7 % — SIGNIFICANT CHANGE UP (ref 2–14)
NEUTROPHILS # BLD AUTO: 2.5 K/UL — SIGNIFICANT CHANGE UP (ref 1.8–7.4)
NEUTROPHILS NFR BLD AUTO: 41.9 % — LOW (ref 43–77)
NITRITE UR-MCNC: NEGATIVE — SIGNIFICANT CHANGE UP
PH UR: 6 — SIGNIFICANT CHANGE UP (ref 5–8)
PLATELET # BLD AUTO: 234 K/UL — SIGNIFICANT CHANGE UP (ref 150–400)
POTASSIUM SERPL-MCNC: 3.8 MMOL/L — SIGNIFICANT CHANGE UP (ref 3.5–5.3)
POTASSIUM SERPL-SCNC: 3.8 MMOL/L — SIGNIFICANT CHANGE UP (ref 3.5–5.3)
PROT SERPL-MCNC: 7 G/DL — SIGNIFICANT CHANGE UP (ref 6.6–8.7)
PROT UR-MCNC: 100 MG/DL
PROTHROM AB SERPL-ACNC: 12.4 SEC — SIGNIFICANT CHANGE UP (ref 9.9–13.4)
RBC # BLD: 4.12 M/UL — SIGNIFICANT CHANGE UP (ref 3.8–5.2)
RBC # FLD: 13.5 % — SIGNIFICANT CHANGE UP (ref 10.3–14.5)
RBC CASTS # UR COMP ASSIST: 885 /HPF — HIGH (ref 0–4)
SODIUM SERPL-SCNC: 143 MMOL/L — SIGNIFICANT CHANGE UP (ref 135–145)
SP GR SPEC: 1.02 — SIGNIFICANT CHANGE UP (ref 1–1.03)
SQUAMOUS # UR AUTO: 2 /HPF — SIGNIFICANT CHANGE UP (ref 0–5)
UROBILINOGEN FLD QL: 1 MG/DL — SIGNIFICANT CHANGE UP (ref 0.2–1)
WBC # BLD: 5.98 K/UL — SIGNIFICANT CHANGE UP (ref 3.8–10.5)
WBC # FLD AUTO: 5.98 K/UL — SIGNIFICANT CHANGE UP (ref 3.8–10.5)
WBC UR QL: 8 /HPF — HIGH (ref 0–5)

## 2025-01-18 PROCEDURE — 87186 SC STD MICRODIL/AGAR DIL: CPT

## 2025-01-18 PROCEDURE — 74177 CT ABD & PELVIS W/CONTRAST: CPT | Mod: 26

## 2025-01-18 PROCEDURE — 74177 CT ABD & PELVIS W/CONTRAST: CPT | Mod: MC

## 2025-01-18 PROCEDURE — 81001 URINALYSIS AUTO W/SCOPE: CPT

## 2025-01-18 PROCEDURE — 85025 COMPLETE CBC W/AUTO DIFF WBC: CPT

## 2025-01-18 PROCEDURE — 85610 PROTHROMBIN TIME: CPT

## 2025-01-18 PROCEDURE — 86850 RBC ANTIBODY SCREEN: CPT

## 2025-01-18 PROCEDURE — 86901 BLOOD TYPING SEROLOGIC RH(D): CPT

## 2025-01-18 PROCEDURE — 36415 COLL VENOUS BLD VENIPUNCTURE: CPT

## 2025-01-18 PROCEDURE — 86900 BLOOD TYPING SEROLOGIC ABO: CPT

## 2025-01-18 PROCEDURE — 99284 EMERGENCY DEPT VISIT MOD MDM: CPT | Mod: 25

## 2025-01-18 PROCEDURE — 85730 THROMBOPLASTIN TIME PARTIAL: CPT

## 2025-01-18 PROCEDURE — 99284 EMERGENCY DEPT VISIT MOD MDM: CPT

## 2025-01-18 PROCEDURE — 87086 URINE CULTURE/COLONY COUNT: CPT

## 2025-01-18 PROCEDURE — 80053 COMPREHEN METABOLIC PANEL: CPT

## 2025-01-18 PROCEDURE — 87077 CULTURE AEROBIC IDENTIFY: CPT

## 2025-01-18 PROCEDURE — 82550 ASSAY OF CK (CPK): CPT

## 2025-01-21 LAB
-  AMOXICILLIN/CLAVULANIC ACID: SIGNIFICANT CHANGE UP
-  AMPICILLIN/SULBACTAM: SIGNIFICANT CHANGE UP
-  AMPICILLIN: SIGNIFICANT CHANGE UP
-  AZTREONAM: SIGNIFICANT CHANGE UP
-  CEFAZOLIN: SIGNIFICANT CHANGE UP
-  CEFEPIME: SIGNIFICANT CHANGE UP
-  CEFOXITIN: SIGNIFICANT CHANGE UP
-  CEFTRIAXONE: SIGNIFICANT CHANGE UP
-  CEFUROXIME: SIGNIFICANT CHANGE UP
-  CIPROFLOXACIN: SIGNIFICANT CHANGE UP
-  ERTAPENEM: SIGNIFICANT CHANGE UP
-  GENTAMICIN: SIGNIFICANT CHANGE UP
-  LEVOFLOXACIN: SIGNIFICANT CHANGE UP
-  MEROPENEM: SIGNIFICANT CHANGE UP
-  NITROFURANTOIN: SIGNIFICANT CHANGE UP
-  PIPERACILLIN/TAZOBACTAM: SIGNIFICANT CHANGE UP
-  TOBRAMYCIN: SIGNIFICANT CHANGE UP
-  TRIMETHOPRIM/SULFAMETHOXAZOLE: SIGNIFICANT CHANGE UP
CULTURE RESULTS: ABNORMAL
METHOD TYPE: SIGNIFICANT CHANGE UP
ORGANISM # SPEC MICROSCOPIC CNT: ABNORMAL
ORGANISM # SPEC MICROSCOPIC CNT: SIGNIFICANT CHANGE UP
SPECIMEN SOURCE: SIGNIFICANT CHANGE UP

## 2025-04-18 ENCOUNTER — OFFICE (OUTPATIENT)
Dept: URBAN - METROPOLITAN AREA CLINIC 113 | Facility: CLINIC | Age: 75
Setting detail: OPHTHALMOLOGY
End: 2025-04-18
Payer: MEDICARE

## 2025-04-18 DIAGNOSIS — H16.222: ICD-10-CM

## 2025-04-18 DIAGNOSIS — H16.223: ICD-10-CM

## 2025-04-18 DIAGNOSIS — H43.811: ICD-10-CM

## 2025-04-18 DIAGNOSIS — H16.221: ICD-10-CM

## 2025-04-18 PROCEDURE — 92004 COMPRE OPH EXAM NEW PT 1/>: CPT | Performed by: OPHTHALMOLOGY

## 2025-04-18 PROCEDURE — 92250 FUNDUS PHOTOGRAPHY W/I&R: CPT | Performed by: OPHTHALMOLOGY

## 2025-04-18 PROCEDURE — 83861 MICROFLUID ANALY TEARS: CPT | Mod: QW,LT | Performed by: OPHTHALMOLOGY

## 2025-04-18 PROCEDURE — 83861 MICROFLUID ANALY TEARS: CPT | Mod: QW,RT | Performed by: OPHTHALMOLOGY

## 2025-04-18 ASSESSMENT — KERATOMETRY
OS_AXISANGLE_DEGREES: 132
OS_K1POWER_DIOPTERS: 45.00
OD_K1POWER_DIOPTERS: 45.25
OD_K2POWER_DIOPTERS: 45.50
OD_AXISANGLE_DEGREES: 053
OS_K2POWER_DIOPTERS: 46.00

## 2025-04-18 ASSESSMENT — CONFRONTATIONAL VISUAL FIELD TEST (CVF)
OS_FINDINGS: FULL
OD_FINDINGS: FULL

## 2025-04-18 ASSESSMENT — REFRACTION_AUTOREFRACTION
OD_CYLINDER: -1.25
OS_CYLINDER: -1.25
OD_SPHERE: +1.25
OS_SPHERE: +0.75
OD_AXIS: 095
OS_AXIS: 070

## 2025-04-18 ASSESSMENT — LID POSITION - COMMENTS
OS_COMMENTS: INCISION INTACT
OD_COMMENTS: INCISION INTACT

## 2025-04-18 ASSESSMENT — TONOMETRY
OS_IOP_MMHG: 16
OD_IOP_MMHG: 16

## 2025-04-18 ASSESSMENT — VISUAL ACUITY
OS_BCVA: 20/20-
OD_BCVA: 20/20

## 2025-04-18 ASSESSMENT — SUPERFICIAL PUNCTATE KERATITIS (SPK)
OD_SPK: 1+
OS_SPK: 2+